# Patient Record
Sex: MALE | Race: OTHER | HISPANIC OR LATINO
[De-identification: names, ages, dates, MRNs, and addresses within clinical notes are randomized per-mention and may not be internally consistent; named-entity substitution may affect disease eponyms.]

---

## 2021-06-28 ENCOUNTER — TRANSCRIPTION ENCOUNTER (OUTPATIENT)
Age: 66
End: 2021-06-28

## 2021-06-28 ENCOUNTER — INPATIENT (INPATIENT)
Facility: HOSPITAL | Age: 66
LOS: 5 days | Discharge: HOME | DRG: 20 | End: 2021-07-04
Attending: NEUROLOGICAL SURGERY | Admitting: NEUROLOGICAL SURGERY
Payer: COMMERCIAL

## 2021-06-28 VITALS
HEART RATE: 61 BPM | SYSTOLIC BLOOD PRESSURE: 159 MMHG | DIASTOLIC BLOOD PRESSURE: 94 MMHG | HEIGHT: 70 IN | OXYGEN SATURATION: 98 % | WEIGHT: 199.96 LBS | RESPIRATION RATE: 18 BRPM | TEMPERATURE: 98 F

## 2021-06-28 DIAGNOSIS — S06.5X9A TRAUMATIC SUBDURAL HEMORRHAGE WITH LOSS OF CONSCIOUSNESS OF UNSPECIFIED DURATION, INITIAL ENCOUNTER: ICD-10-CM

## 2021-06-28 LAB
ALBUMIN SERPL ELPH-MCNC: 4.3 G/DL — SIGNIFICANT CHANGE UP (ref 3.3–5)
ALP SERPL-CCNC: 78 U/L — SIGNIFICANT CHANGE UP (ref 40–120)
ALT FLD-CCNC: 16 U/L — SIGNIFICANT CHANGE UP (ref 10–45)
ANION GAP SERPL CALC-SCNC: 10 MMOL/L — SIGNIFICANT CHANGE UP (ref 5–17)
ANION GAP SERPL CALC-SCNC: 15 MMOL/L — SIGNIFICANT CHANGE UP (ref 5–17)
APTT BLD: 23.9 SEC — LOW (ref 27.5–35.5)
AST SERPL-CCNC: 13 U/L — SIGNIFICANT CHANGE UP (ref 10–40)
BASOPHILS # BLD AUTO: 0.05 K/UL — SIGNIFICANT CHANGE UP (ref 0–0.2)
BASOPHILS NFR BLD AUTO: 0.4 % — SIGNIFICANT CHANGE UP (ref 0–2)
BILIRUB SERPL-MCNC: 0.3 MG/DL — SIGNIFICANT CHANGE UP (ref 0.2–1.2)
BLD GP AB SCN SERPL QL: NEGATIVE — SIGNIFICANT CHANGE UP
BUN SERPL-MCNC: 13 MG/DL — SIGNIFICANT CHANGE UP (ref 7–23)
BUN SERPL-MCNC: 18 MG/DL — SIGNIFICANT CHANGE UP (ref 7–23)
CALCIUM SERPL-MCNC: 8.5 MG/DL — SIGNIFICANT CHANGE UP (ref 8.4–10.5)
CALCIUM SERPL-MCNC: 9.3 MG/DL — SIGNIFICANT CHANGE UP (ref 8.4–10.5)
CHLORIDE SERPL-SCNC: 101 MMOL/L — SIGNIFICANT CHANGE UP (ref 96–108)
CHLORIDE SERPL-SCNC: 101 MMOL/L — SIGNIFICANT CHANGE UP (ref 96–108)
CO2 SERPL-SCNC: 22 MMOL/L — SIGNIFICANT CHANGE UP (ref 22–31)
CO2 SERPL-SCNC: 23 MMOL/L — SIGNIFICANT CHANGE UP (ref 22–31)
CREAT SERPL-MCNC: 0.59 MG/DL — SIGNIFICANT CHANGE UP (ref 0.5–1.3)
CREAT SERPL-MCNC: 0.81 MG/DL — SIGNIFICANT CHANGE UP (ref 0.5–1.3)
EOSINOPHIL # BLD AUTO: 0.25 K/UL — SIGNIFICANT CHANGE UP (ref 0–0.5)
EOSINOPHIL NFR BLD AUTO: 2 % — SIGNIFICANT CHANGE UP (ref 0–6)
GLUCOSE SERPL-MCNC: 134 MG/DL — HIGH (ref 70–99)
GLUCOSE SERPL-MCNC: 146 MG/DL — HIGH (ref 70–99)
HCT VFR BLD CALC: 38.2 % — LOW (ref 39–50)
HCT VFR BLD CALC: 44.7 % — SIGNIFICANT CHANGE UP (ref 39–50)
HGB BLD-MCNC: 13.1 G/DL — SIGNIFICANT CHANGE UP (ref 13–17)
HGB BLD-MCNC: 14.7 G/DL — SIGNIFICANT CHANGE UP (ref 13–17)
IMM GRANULOCYTES NFR BLD AUTO: 0.3 % — SIGNIFICANT CHANGE UP (ref 0–1.5)
INR BLD: 1.04 RATIO — SIGNIFICANT CHANGE UP (ref 0.88–1.16)
LYMPHOCYTES # BLD AUTO: 28.9 % — SIGNIFICANT CHANGE UP (ref 13–44)
LYMPHOCYTES # BLD AUTO: 3.59 K/UL — HIGH (ref 1–3.3)
MAGNESIUM SERPL-MCNC: 2 MG/DL — SIGNIFICANT CHANGE UP (ref 1.6–2.6)
MCHC RBC-ENTMCNC: 30.1 PG — SIGNIFICANT CHANGE UP (ref 27–34)
MCHC RBC-ENTMCNC: 30.8 PG — SIGNIFICANT CHANGE UP (ref 27–34)
MCHC RBC-ENTMCNC: 32.9 GM/DL — SIGNIFICANT CHANGE UP (ref 32–36)
MCHC RBC-ENTMCNC: 34.3 GM/DL — SIGNIFICANT CHANGE UP (ref 32–36)
MCV RBC AUTO: 89.9 FL — SIGNIFICANT CHANGE UP (ref 80–100)
MCV RBC AUTO: 91.4 FL — SIGNIFICANT CHANGE UP (ref 80–100)
MONOCYTES # BLD AUTO: 0.7 K/UL — SIGNIFICANT CHANGE UP (ref 0–0.9)
MONOCYTES NFR BLD AUTO: 5.6 % — SIGNIFICANT CHANGE UP (ref 2–14)
NEUTROPHILS # BLD AUTO: 7.8 K/UL — HIGH (ref 1.8–7.4)
NEUTROPHILS NFR BLD AUTO: 62.8 % — SIGNIFICANT CHANGE UP (ref 43–77)
NRBC # BLD: 0 /100 WBCS — SIGNIFICANT CHANGE UP (ref 0–0)
NRBC # BLD: 0 /100 WBCS — SIGNIFICANT CHANGE UP (ref 0–0)
PHOSPHATE SERPL-MCNC: 3 MG/DL — SIGNIFICANT CHANGE UP (ref 2.5–4.5)
PLATELET # BLD AUTO: 199 K/UL — SIGNIFICANT CHANGE UP (ref 150–400)
PLATELET # BLD AUTO: 251 K/UL — SIGNIFICANT CHANGE UP (ref 150–400)
POTASSIUM SERPL-MCNC: 3.8 MMOL/L — SIGNIFICANT CHANGE UP (ref 3.5–5.3)
POTASSIUM SERPL-MCNC: 3.8 MMOL/L — SIGNIFICANT CHANGE UP (ref 3.5–5.3)
POTASSIUM SERPL-SCNC: 3.8 MMOL/L — SIGNIFICANT CHANGE UP (ref 3.5–5.3)
POTASSIUM SERPL-SCNC: 3.8 MMOL/L — SIGNIFICANT CHANGE UP (ref 3.5–5.3)
PROT SERPL-MCNC: 7.5 G/DL — SIGNIFICANT CHANGE UP (ref 6–8.3)
PROTHROM AB SERPL-ACNC: 12.5 SEC — SIGNIFICANT CHANGE UP (ref 10.6–13.6)
RAPID RVP RESULT: SIGNIFICANT CHANGE UP
RBC # BLD: 4.25 M/UL — SIGNIFICANT CHANGE UP (ref 4.2–5.8)
RBC # BLD: 4.89 M/UL — SIGNIFICANT CHANGE UP (ref 4.2–5.8)
RBC # FLD: 12.6 % — SIGNIFICANT CHANGE UP (ref 10.3–14.5)
RBC # FLD: 12.6 % — SIGNIFICANT CHANGE UP (ref 10.3–14.5)
RH IG SCN BLD-IMP: POSITIVE — SIGNIFICANT CHANGE UP
SARS-COV-2 RNA SPEC QL NAA+PROBE: SIGNIFICANT CHANGE UP
SODIUM SERPL-SCNC: 134 MMOL/L — LOW (ref 135–145)
SODIUM SERPL-SCNC: 138 MMOL/L — SIGNIFICANT CHANGE UP (ref 135–145)
WBC # BLD: 11.07 K/UL — HIGH (ref 3.8–10.5)
WBC # BLD: 12.43 K/UL — HIGH (ref 3.8–10.5)
WBC # FLD AUTO: 11.07 K/UL — HIGH (ref 3.8–10.5)
WBC # FLD AUTO: 12.43 K/UL — HIGH (ref 3.8–10.5)

## 2021-06-28 PROCEDURE — 70486 CT MAXILLOFACIAL W/O DYE: CPT | Mod: 26

## 2021-06-28 PROCEDURE — 93010 ELECTROCARDIOGRAM REPORT: CPT

## 2021-06-28 PROCEDURE — 99291 CRITICAL CARE FIRST HOUR: CPT | Mod: 25

## 2021-06-28 PROCEDURE — G1004: CPT

## 2021-06-28 PROCEDURE — 70496 CT ANGIOGRAPHY HEAD: CPT | Mod: 26,MG

## 2021-06-28 PROCEDURE — 76377 3D RENDER W/INTRP POSTPROCES: CPT | Mod: 26

## 2021-06-28 PROCEDURE — 70498 CT ANGIOGRAPHY NECK: CPT | Mod: 26,MG

## 2021-06-28 PROCEDURE — 71045 X-RAY EXAM CHEST 1 VIEW: CPT | Mod: 26

## 2021-06-28 PROCEDURE — 76376 3D RENDER W/INTRP POSTPROCES: CPT | Mod: 26

## 2021-06-28 PROCEDURE — 70450 CT HEAD/BRAIN W/O DYE: CPT | Mod: 26,76,77

## 2021-06-28 PROCEDURE — 99291 CRITICAL CARE FIRST HOUR: CPT

## 2021-06-28 PROCEDURE — 36620 INSERTION CATHETER ARTERY: CPT

## 2021-06-28 RX ORDER — HYDROMORPHONE HYDROCHLORIDE 2 MG/ML
0.25 INJECTION INTRAMUSCULAR; INTRAVENOUS; SUBCUTANEOUS ONCE
Refills: 0 | Status: DISCONTINUED | OUTPATIENT
Start: 2021-06-28 | End: 2021-06-28

## 2021-06-28 RX ORDER — ONDANSETRON 8 MG/1
4 TABLET, FILM COATED ORAL ONCE
Refills: 0 | Status: COMPLETED | OUTPATIENT
Start: 2021-06-28 | End: 2021-06-28

## 2021-06-28 RX ORDER — ACETAMINOPHEN 500 MG
1000 TABLET ORAL ONCE
Refills: 0 | Status: COMPLETED | OUTPATIENT
Start: 2021-06-28 | End: 2021-06-28

## 2021-06-28 RX ORDER — SODIUM CHLORIDE 9 MG/ML
1000 INJECTION INTRAMUSCULAR; INTRAVENOUS; SUBCUTANEOUS ONCE
Refills: 0 | Status: COMPLETED | OUTPATIENT
Start: 2021-06-28 | End: 2021-06-28

## 2021-06-28 RX ORDER — ACETAMINOPHEN 500 MG
650 TABLET ORAL EVERY 6 HOURS
Refills: 0 | Status: DISCONTINUED | OUTPATIENT
Start: 2021-06-28 | End: 2021-07-04

## 2021-06-28 RX ORDER — METOCLOPRAMIDE HCL 10 MG
10 TABLET ORAL ONCE
Refills: 0 | Status: COMPLETED | OUTPATIENT
Start: 2021-06-28 | End: 2021-06-28

## 2021-06-28 RX ORDER — PROCHLORPERAZINE MALEATE 5 MG
10 TABLET ORAL ONCE
Refills: 0 | Status: COMPLETED | OUTPATIENT
Start: 2021-06-28 | End: 2021-06-28

## 2021-06-28 RX ORDER — LEVETIRACETAM 250 MG/1
500 TABLET, FILM COATED ORAL EVERY 12 HOURS
Refills: 0 | Status: DISCONTINUED | OUTPATIENT
Start: 2021-06-28 | End: 2021-06-29

## 2021-06-28 RX ORDER — TRAMADOL HYDROCHLORIDE 50 MG/1
50 TABLET ORAL EVERY 6 HOURS
Refills: 0 | Status: DISCONTINUED | OUTPATIENT
Start: 2021-06-28 | End: 2021-06-29

## 2021-06-28 RX ORDER — TRAMADOL HYDROCHLORIDE 50 MG/1
25 TABLET ORAL EVERY 6 HOURS
Refills: 0 | Status: DISCONTINUED | OUTPATIENT
Start: 2021-06-28 | End: 2021-07-04

## 2021-06-28 RX ORDER — POTASSIUM CHLORIDE 20 MEQ
10 PACKET (EA) ORAL
Refills: 0 | Status: COMPLETED | OUTPATIENT
Start: 2021-06-28 | End: 2021-06-28

## 2021-06-28 RX ORDER — ACETAMINOPHEN 500 MG
975 TABLET ORAL ONCE
Refills: 0 | Status: COMPLETED | OUTPATIENT
Start: 2021-06-28 | End: 2021-06-28

## 2021-06-28 RX ORDER — NIMODIPINE 60 MG/10ML
60 SOLUTION ORAL EVERY 4 HOURS
Refills: 0 | Status: DISCONTINUED | OUTPATIENT
Start: 2021-06-28 | End: 2021-06-29

## 2021-06-28 RX ORDER — SODIUM CHLORIDE 9 MG/ML
1000 INJECTION INTRAMUSCULAR; INTRAVENOUS; SUBCUTANEOUS
Refills: 0 | Status: DISCONTINUED | OUTPATIENT
Start: 2021-06-28 | End: 2021-07-01

## 2021-06-28 RX ORDER — MAGNESIUM SULFATE 500 MG/ML
1 VIAL (ML) INJECTION ONCE
Refills: 0 | Status: COMPLETED | OUTPATIENT
Start: 2021-06-28 | End: 2021-06-28

## 2021-06-28 RX ORDER — DEXAMETHASONE 0.5 MG/5ML
4 ELIXIR ORAL ONCE
Refills: 0 | Status: COMPLETED | OUTPATIENT
Start: 2021-06-28 | End: 2021-06-28

## 2021-06-28 RX ORDER — POLYETHYLENE GLYCOL 3350 17 G/17G
17 POWDER, FOR SOLUTION ORAL
Refills: 0 | Status: DISCONTINUED | OUTPATIENT
Start: 2021-06-28 | End: 2021-07-04

## 2021-06-28 RX ORDER — LEVETIRACETAM 250 MG/1
500 TABLET, FILM COATED ORAL EVERY 12 HOURS
Refills: 0 | Status: DISCONTINUED | OUTPATIENT
Start: 2021-06-28 | End: 2021-06-28

## 2021-06-28 RX ORDER — NICARDIPINE HYDROCHLORIDE 30 MG/1
5 CAPSULE, EXTENDED RELEASE ORAL
Qty: 40 | Refills: 0 | Status: DISCONTINUED | OUTPATIENT
Start: 2021-06-28 | End: 2021-06-29

## 2021-06-28 RX ORDER — SENNA PLUS 8.6 MG/1
2 TABLET ORAL AT BEDTIME
Refills: 0 | Status: DISCONTINUED | OUTPATIENT
Start: 2021-06-28 | End: 2021-07-04

## 2021-06-28 RX ORDER — METOCLOPRAMIDE HCL 10 MG
10 TABLET ORAL ONCE
Refills: 0 | Status: DISCONTINUED | OUTPATIENT
Start: 2021-06-28 | End: 2021-06-28

## 2021-06-28 RX ORDER — MORPHINE SULFATE 50 MG/1
4 CAPSULE, EXTENDED RELEASE ORAL ONCE
Refills: 0 | Status: DISCONTINUED | OUTPATIENT
Start: 2021-06-28 | End: 2021-06-28

## 2021-06-28 RX ORDER — TRAMADOL HYDROCHLORIDE 50 MG/1
50 TABLET ORAL ONCE
Refills: 0 | Status: DISCONTINUED | OUTPATIENT
Start: 2021-06-28 | End: 2021-06-28

## 2021-06-28 RX ORDER — CHLORHEXIDINE GLUCONATE 213 G/1000ML
1 SOLUTION TOPICAL
Refills: 0 | Status: DISCONTINUED | OUTPATIENT
Start: 2021-06-28 | End: 2021-07-04

## 2021-06-28 RX ORDER — HYDROMORPHONE HYDROCHLORIDE 2 MG/ML
0.5 INJECTION INTRAMUSCULAR; INTRAVENOUS; SUBCUTANEOUS ONCE
Refills: 0 | Status: DISCONTINUED | OUTPATIENT
Start: 2021-06-28 | End: 2021-06-28

## 2021-06-28 RX ADMIN — SODIUM CHLORIDE 1000 MILLILITER(S): 9 INJECTION INTRAMUSCULAR; INTRAVENOUS; SUBCUTANEOUS at 10:40

## 2021-06-28 RX ADMIN — MORPHINE SULFATE 4 MILLIGRAM(S): 50 CAPSULE, EXTENDED RELEASE ORAL at 10:40

## 2021-06-28 RX ADMIN — Medication 4 MILLIGRAM(S): at 23:14

## 2021-06-28 RX ADMIN — Medication 400 MILLIGRAM(S): at 20:20

## 2021-06-28 RX ADMIN — HYDROMORPHONE HYDROCHLORIDE 0.5 MILLIGRAM(S): 2 INJECTION INTRAMUSCULAR; INTRAVENOUS; SUBCUTANEOUS at 23:30

## 2021-06-28 RX ADMIN — NICARDIPINE HYDROCHLORIDE 25 MG/HR: 30 CAPSULE, EXTENDED RELEASE ORAL at 14:07

## 2021-06-28 RX ADMIN — Medication 10 MILLIGRAM(S): at 07:15

## 2021-06-28 RX ADMIN — Medication 100 MILLIEQUIVALENT(S): at 23:15

## 2021-06-28 RX ADMIN — HYDROMORPHONE HYDROCHLORIDE 0.25 MILLIGRAM(S): 2 INJECTION INTRAMUSCULAR; INTRAVENOUS; SUBCUTANEOUS at 22:05

## 2021-06-28 RX ADMIN — SODIUM CHLORIDE 1000 MILLILITER(S): 9 INJECTION INTRAMUSCULAR; INTRAVENOUS; SUBCUTANEOUS at 08:41

## 2021-06-28 RX ADMIN — Medication 975 MILLIGRAM(S): at 07:15

## 2021-06-28 RX ADMIN — Medication 400 MILLIGRAM(S): at 13:54

## 2021-06-28 RX ADMIN — ONDANSETRON 4 MILLIGRAM(S): 8 TABLET, FILM COATED ORAL at 06:45

## 2021-06-28 RX ADMIN — Medication 1 GRAM(S): at 10:40

## 2021-06-28 RX ADMIN — TRAMADOL HYDROCHLORIDE 50 MILLIGRAM(S): 50 TABLET ORAL at 19:30

## 2021-06-28 RX ADMIN — NIMODIPINE 60 MILLIGRAM(S): 60 SOLUTION ORAL at 14:10

## 2021-06-28 RX ADMIN — SODIUM CHLORIDE 75 MILLILITER(S): 9 INJECTION INTRAMUSCULAR; INTRAVENOUS; SUBCUTANEOUS at 16:50

## 2021-06-28 RX ADMIN — SODIUM CHLORIDE 1000 MILLILITER(S): 9 INJECTION INTRAMUSCULAR; INTRAVENOUS; SUBCUTANEOUS at 06:45

## 2021-06-28 RX ADMIN — HYDROMORPHONE HYDROCHLORIDE 0.5 MILLIGRAM(S): 2 INJECTION INTRAMUSCULAR; INTRAVENOUS; SUBCUTANEOUS at 23:15

## 2021-06-28 RX ADMIN — HYDROMORPHONE HYDROCHLORIDE 0.25 MILLIGRAM(S): 2 INJECTION INTRAMUSCULAR; INTRAVENOUS; SUBCUTANEOUS at 21:50

## 2021-06-28 RX ADMIN — Medication 100 GRAM(S): at 09:49

## 2021-06-28 RX ADMIN — Medication 975 MILLIGRAM(S): at 06:56

## 2021-06-28 RX ADMIN — Medication 100 MILLIEQUIVALENT(S): at 22:13

## 2021-06-28 RX ADMIN — Medication 1000 MILLIGRAM(S): at 20:35

## 2021-06-28 RX ADMIN — LEVETIRACETAM 400 MILLIGRAM(S): 250 TABLET, FILM COATED ORAL at 17:04

## 2021-06-28 RX ADMIN — Medication 10 MILLIGRAM(S): at 23:00

## 2021-06-28 RX ADMIN — MORPHINE SULFATE 4 MILLIGRAM(S): 50 CAPSULE, EXTENDED RELEASE ORAL at 08:40

## 2021-06-28 RX ADMIN — ONDANSETRON 4 MILLIGRAM(S): 8 TABLET, FILM COATED ORAL at 20:40

## 2021-06-28 RX ADMIN — Medication 10 MILLIGRAM(S): at 23:15

## 2021-06-28 RX ADMIN — Medication 1000 MILLIGRAM(S): at 14:09

## 2021-06-28 RX ADMIN — TRAMADOL HYDROCHLORIDE 50 MILLIGRAM(S): 50 TABLET ORAL at 20:00

## 2021-06-28 RX ADMIN — CHLORHEXIDINE GLUCONATE 1 APPLICATION(S): 213 SOLUTION TOPICAL at 22:12

## 2021-06-28 RX ADMIN — SODIUM CHLORIDE 1000 MILLILITER(S): 9 INJECTION INTRAMUSCULAR; INTRAVENOUS; SUBCUTANEOUS at 07:15

## 2021-06-28 NOTE — ED PROVIDER NOTE - NS ED ROS FT
Constitutional: no fevers; no chills  HEENT: no visual changes, no sore throat, no rhinorrhea  CV: no cp; no palpitations  Resp: no sob; no cough  GI: no abd pain, nausea, vomiting, no diarrhea, no constipation  : no dysuria, no hematuria  MSK: no myalgais; no arthralgias  skin: no rashes  neuro: HA, no numbness; no weakness, no tingling; dizzy;   ROS statement: all other ROS negative except as per HPI

## 2021-06-28 NOTE — ED PROVIDER NOTE - PROGRESS NOTE DETAILS
Skinny FALCON: Received sign out from my colleague Dr. Grande pt presents w headache in setting of recent possible OE/OM?, HA worsening over last week and acutely so over last few days a/w n/v, at time of sign out pending labs, ct, reassessment, possible LP pending findings. Li: radiology called with significant finding, bilateral subdural hematoma without a clear course. no hx of trauma. neurosurg consult and to see patient. preop labs obtained. pending 4 hr CT at 1pm Li: DCU PA states patient is too complex for CDU. will likely admit patient to either neurosrg vs medicine based on 4hr CT Skinny FALCON: see PNs as above, pt w c/f b/l SDH seen and eval'd by neuro, consider for possible csf leak at this time however unclear etiology as to location, pt w/o fever in ED, reports while away did [place] line into [place] therefore possible traumatic mechanism? neurosurg recommending interval cth and reassessment will re-discuss with nsurg thereafter and dispo accordingly.

## 2021-06-28 NOTE — ED ADULT NURSE NOTE - OBJECTIVE STATEMENT
65y M, A&Ox4, ambulatory, presents to the ED with wife at bedside from home c/o . Gross neuro intact, lungs cta bilaterally, no difficulty speaking in complete sentences, s1s2 heart sounds heard, pulses x 4, moving all extremities, abdomen soft nontender nondistended, skin intact. Pt denies fevers/chills, numbness/tingling, weakness, dizziness, vision changes, cp, sob, abd pain, diarrhea, dysuria, hematuria, bloody stools, back pain. 65y M, A&Ox4, ambulatory, presents to the ED with wife at bedside from home c/o headache. [place], 1 week ago dx with ear infection- did not complete course of antibiotics. Now having throbbing headaches, worse since saturday a/w neck pain and vomiting. Gross neuro intact, lungs cta bilaterally, no difficulty speaking in complete sentences, s1s2 heart sounds heard, pulses x 4, moving all extremities, abdomen soft nontender nondistended, skin intact. Pt denies fevers/chills, numbness/tingling, weakness, dizziness, vision changes, cp, sob, abd pain, diarrhea, dysuria, hematuria, bloody stools, back pain.

## 2021-06-28 NOTE — ED PROVIDER NOTE - OBJECTIVE STATEMENT
66 yo M with no known PMHx, presents to the ED c/o worsening b/l temporal and occipital HA over the past 3 days. Pt returned from the [place] 2 weeks ago. 1 week ago, noted "whooshing" sensation to his R ear. Saw an ENT, was given oral abx and otic abx drops but only took about 2 doses. He had this headache and it was mild however, 3 days ago, it has worsened. He then felt nausea and vomited a few times. Pt also noting dizziness, described as unsteadiness. The wife, who is at bedside, endorsed that pt appeared unsteady when ambulating. He is also endorsing neck pain/stiffness. He denies fevers, chills, diarrhea, cp, sob, numbness, weakness, visual changes (diplopia, blurred vision, loss of vision), abd pain.

## 2021-06-28 NOTE — ED ADULT NURSE REASSESSMENT NOTE - NS ED NURSE REASSESS COMMENT FT1
Report received from ERNIE Benoit. Pt is breathing unlabored on RA. VSS. Pt endorses headache at this time, denies blurry vision, N+V. PERRL. Speech clear. Pt medicated as per MD order. Educated pt on plan of care. Safety and comfort maintained. Awaiting CT. Call bell within reach. Pt wife at bedside.

## 2021-06-28 NOTE — H&P ADULT - HISTORY OF PRESENT ILLNESS
66 yo M with no known PMHx, presents to the ED c/o worsening b/l temporal and occipital HA over the past 3 days. Pt returned from the [place] 2 weeks ago. 1 week ago, noted "whooshing" sensation to his R ear. Saw an ENT, was given oral abx and otic abx drops but only took about 2 doses. He had this headache and it was mild however, 3 days ago, it has worsened. He then felt nausea and vomited a few times. Pt also noting dizziness, described as unsteadiness. The wife, who is at bedside, endorsed that pt appeared unsteady when ambulating. He is also endorsing neck pain/stiffness. He denies fevers, chills, diarrhea, cp, sob, numbness, weakness, visual changes (diplopia, blurred vision, loss of vision), abd pain

## 2021-06-28 NOTE — DISCHARGE NOTE NURSING/CASE MANAGEMENT/SOCIAL WORK - PATIENT PORTAL LINK FT
You can access the FollowMyHealth Patient Portal offered by [facility] by registering at the following website: [email or web address]. By joining [employer]’s FollowMyHealth portal, you will also be able to view your health information using other applications (apps) compatible with our system.

## 2021-06-28 NOTE — PROVIDER CONTACT NOTE (MEDICATION) - ACTION/TREATMENT ORDERED:
2200 Nimodipine held as per NO Roberts, will continue to monitor 2200 and 0200 Nimodipine held as per NO Roberts, will continue to monitor 2200, 0200 and 0600n Nimodipine held as per NO Roberts, will continue to monitor

## 2021-06-28 NOTE — CHART NOTE - NSCHARTNOTEFT_GEN_A_CORE
Was notified by patient's RN Brittany that 6pm dose of nimodipine was held due to bradycardia (HR 55). Agreed with above plan.

## 2021-06-28 NOTE — ED PROVIDER NOTE - ATTENDING CONTRIBUTION TO CARE
Pt presents with HA worsening over 3 days, not thunderclap, along with unsteady gait. pt also recently tx for ear infection. on exam, pt is neuro intact. however, symptoms are concerning for intracranial pathology. pt will get Ct/CTA to r/o bleed. if neg, may require LP to r/o bleed vs infection. At the end of my shift, I signed off the care of the patient to oncoming physician. Plan is for await imaging, reassess.

## 2021-06-28 NOTE — ED ADULT NURSE REASSESSMENT NOTE - NS ED NURSE REASSESS COMMENT FT1
Pt is breathing unlabored on RA. VSS. See neuro paper flowsheet. Educated pt on plan of care. Safety and comfort maintained. Pt provided urinal. Awaiting neurosurgery. Call bell within reach.

## 2021-06-28 NOTE — ED PROVIDER NOTE - CLINICAL SUMMARY MEDICAL DECISION MAKING FREE TEXT BOX
Kenn Pritchard MD. pt with no significant pmhx presents w/ HA x1 week in the setting of being tx'd for ?otitis externa with oral and otic gtt antibitoics. pt did not compelte course of abx. 3 days ago, headache became worse. he kiran a thunderclap like sensation but pt has had vomiting and dizziness. the wife describes an unsteady gait when ambulating. pt actively vomiting upon my history and physical. pt also endorsing neck stiffness. pt neurologically intact. walks w/ steady gait. no cerebellar signs. pt is afebrile but reports subjetive neck stiffness although there are no objective neck stiffness. although pt appears well, he does not have known medical hx and there is a concern for possible meningitis given dx of otitis externa and incomplete abx use vs ?sah hemorrhage given worsening of sx with vomiting. will plan for labs, ivf, zofran for the nausea, ct head. if ct head negative and patient's pain not improved, low threshold to LP to eval for meningitis. will need reassessment Kenn Pritchard MD. pt with no significant pmhx presents w/ HA x1 week in the setting of being tx'd for ?otitis externa with oral and otic gtt antibiotics. pt did not complete course of abx. 3 days ago, headache became worse. he denies a thunderclap like sensation but pt has had vomiting and dizziness. the wife describes an unsteady gait when ambulating. pt actively vomiting upon my history and physical. pt also endorsing neck stiffness and has a whooshing right ear sensation. pt neurologically intact. walks w/ steady gait. no cerebellar signs. pt is afebrile but reports subjective neck stiffness although there are no objective neck stiffness. although pt appears well, he does not have known medical hx and there is a concern for possible meningitis given dx of otitis externa and incomplete abx use vs ?sah hemorrhage given worsening of sx with vomiting. will plan for labs, ivf, zofran for the nausea, ct head, cta for possible vert dissection given whooshing sound.. if ct head negative and patient's pain not improved, low threshold to LP to eval for meningitis. pt would be agreeable to an LP.

## 2021-06-28 NOTE — ED PROVIDER NOTE - PHYSICAL EXAMINATION
PHYSICAL EXAM:  GENERAL: non-toxic appearing; in no respiratory distress  HEAD Atraumatic, Normocephalic  ENT: RIGHT EAR: erythema of the ear canal; tm is gray, non bulging;   NECK: No JVD; FROM  EYES: PERRL, EOMs intact b/l w/out deficits; normal conjunctiva  CHEST/LUNG: CTAB no wheezes/rhonchi/rales  HEART: RRR no murmur/gallops/rubs  ABDOMEN: +BS, soft, NT, ND  EXTREMITIES: No LE edema, +2 radial pulses b/l, +2 DP/PT pulses b/l  MUSCULOSKELETAL: FROM of all 4 extremities  NERVOUS SYSTEM:  A&Ox3, No motor deficits or sensory deficits; CNII-XII intact; no focal neurologic deficits; no dysemtria; no dysdiadochokinesia; ambulate w/ steady gait; negative kernig and brudzinski signs;   SKIN:  No new rashes

## 2021-06-28 NOTE — PROGRESS NOTE ADULT - SUBJECTIVE AND OBJECTIVE BOX
HPI:  66 yo M with no known PMHx, presents to the ED c/o worsening b/l temporal and occipital HA over the past 3 days. Pt returned from the [place] 2 weeks ago. 1 week ago, noted "whooshing" sensation to his R ear. Saw an ENT, was given oral abx and otic abx drops but only took about 2 doses. He had this headache and it was mild however, 3 days ago, it has worsened. He then felt nausea and vomited a few times. Pt also noting dizziness, described as unsteadiness. The wife, who is at bedside, endorsed that pt appeared unsteady when ambulating. He is also endorsing neck pain/stiffness. He denies fevers, chills, diarrhea, cp, sob, numbness, weakness, visual changes (diplopia, blurred vision, loss of vision), abd pain (28 Jun 2021 11:31)      EVENTS:   Admitted to the NSCU for bilateral subdural hematoma      ICU Vital Signs Last 24 Hrs  T(C): 36.7 (28 Jun 2021 13:19), Max: 36.9 (28 Jun 2021 07:15)  T(F): 98.1 (28 Jun 2021 13:19), Max: 98.5 (28 Jun 2021 09:30)  HR: 57 (28 Jun 2021 13:19) (54 - 70)  BP: 153/78 (28 Jun 2021 13:19) (123/67 - 159/94)  BP(mean): 99 (28 Jun 2021 13:19) (99 - 99)  ABP: --  ABP(mean): --  RR: 20 (28 Jun 2021 13:19) (15 - 20)  SpO2: 97% (28 Jun 2021 13:19) (95% - 100%)     06-28 @ 07:01  -  06-28 @ 13:48  --------------------------------------------------------  IN: 75 mL / OUT: 0 mL / NET: 75 mL                             14.7   12.43 )-----------( 251      ( 28 Jun 2021 07:07 )             44.7    06-28    138  |  101  |  18  ----------------------------<  146<H>  3.8   |  22  |  0.81    Ca    9.3      28 Jun 2021 07:07    TPro  7.5  /  Alb  4.3  /  TBili  0.3  /  DBili  x   /  AST  13  /  ALT  16  /  AlkPhos  78  06-28            PHYSICAL EXAM:    General: No Acute Distress     Neurological: Awake, alert oriented to person, place and time, Following Commands, PERRL, EOMI, Face Symmetrical, Speech Fluent, Moving all extremities, Muscle Strength normal in all four extremities, No Drift, Sensation to Light Touch Intact    Pulmonary: Clear to Auscultation, No Rales, No Rhonchi, No Wheezes     Cardiovascular: S1, S2, Regular Rate and Rhythm     Gastrointestinal: Soft, Nontender, Nondistended     Extremities: No calf tenderness     Incision:       MEDICATIONS:  Antibiotics:      Neurological:   acetaminophen   Tablet .. 650 milliGRAM(s) Oral every 6 hours PRN  acetaminophen  IVPB .. 1000 milliGRAM(s) IV Intermittent once  levETIRAcetam 500 milliGRAM(s) Oral every 12 hours  ondansetron Injectable 4 milliGRAM(s) IV Push once    Cardiac:   niCARdipine Infusion 5 mG/Hr IV Continuous <Continuous>  niMODipine 60 milliGRAM(s) Oral every 4 hours    Pulm:    Heme:     Other:        DEVICES: [] Restraints [] MAEGAN/HMV []LD [] ET tube [] Trach [] Chest Tube [] A-line [] Puente [] NGT [] Rectal Tube       Brain CT:    A repeat demonstrated is a left holohemispheric mixed density subdural hematoma measuring roughly 7 mm in greatest depth and not severely change in appearance compared with the prior study. An anterior interhemispheric subdural hematoma is unchanged in appearance.    A predominantly low density right frontal convexity subdural collection measures approximately 6 mm in depth and is unchanged in appearance. There is approximately 4 mm rightward shift, unchanged compared with the prior study.    No hydrocephalus is present.    No new areas of hemorrhage are identified.    The orbits are not remarkable in appearance.    Maxillofacial bone CT:    No acute fracture is identified. The orbital rims are intact. No intraorbital hematoma is identified. There is a mild bilateral frontal and maxillary sinus mucosal thickening. A left maxillary sinus polyp versus retention cyst is present. The remaining paranasal sinuses are well aerated. The tympanomastoid cavities are free of acute disease.    IMPRESSION:    Brain CT: Unchanged right frontal convexity, left holohemispheric and anterior interhemispheric subdural hematomas. Unchanged 4 mm rightward midline shift.    Maxillofacial bone CT: No acute fracture.    A/P:  66 yo M  2 weeks history of headache with worsening headach today.  CT head     Neuro:   Respiratory:   CV:  Endocrine:   Heme/Onc:             DVT ppx:   Renal:   ID:   GI:   Social/Family:   Discharge planning:     Code Status: [] Full Code [] DNR [] DNI [] Goals of Care:   Disposition: [] ICU [] Stroke Unit [] RCU []PCU []Floor [] Discharge Home     Patient at high risk for neurologic deterioration, critical care time, excluding procedures: 45 minutes                    HPI:  64 yo M with no known PMHx, presents to the ED c/o worsening b/l temporal and occipital HA over the past 3 days. Pt returned from the [place] 2 weeks ago. 1 week ago, noted "whooshing" sensation to his R ear. Saw an ENT, was given oral abx and otic abx drops but only took about 2 doses. He had this headache and it was mild however, 3 days ago, it has worsened. He then felt nausea and vomited a few times. Pt also noting dizziness, described as unsteadiness. The wife, who is at bedside, endorsed that pt appeared unsteady when ambulating. He is also endorsing neck pain/stiffness. He denies fevers, chills, diarrhea, cp, sob, numbness, weakness, visual changes (diplopia, blurred vision, loss of vision), abd pain (28 Jun 2021 11:31)      EVENTS:   Admitted to the NSCU for bilateral subdural hematoma      ICU Vital Signs Last 24 Hrs  T(C): 36.7 (28 Jun 2021 13:19), Max: 36.9 (28 Jun 2021 07:15)  T(F): 98.1 (28 Jun 2021 13:19), Max: 98.5 (28 Jun 2021 09:30)  HR: 57 (28 Jun 2021 13:19) (54 - 70)  BP: 153/78 (28 Jun 2021 13:19) (123/67 - 159/94)  BP(mean): 99 (28 Jun 2021 13:19) (99 - 99)  ABP: --  ABP(mean): --  RR: 20 (28 Jun 2021 13:19) (15 - 20)  SpO2: 97% (28 Jun 2021 13:19) (95% - 100%)     06-28 @ 07:01  -  06-28 @ 13:48  --------------------------------------------------------  IN: 75 mL / OUT: 0 mL / NET: 75 mL                             14.7   12.43 )-----------( 251      ( 28 Jun 2021 07:07 )             44.7    06-28    138  |  101  |  18  ----------------------------<  146<H>  3.8   |  22  |  0.81    Ca    9.3      28 Jun 2021 07:07    TPro  7.5  /  Alb  4.3  /  TBili  0.3  /  DBili  x   /  AST  13  /  ALT  16  /  AlkPhos  78  06-28            PHYSICAL EXAM:    General: No Acute Distress     Neurological: Awake, alert oriented to person, place and time, Following Commands, PERRL, EOMI, Face Symmetrical, Speech Fluent, Moving all extremities, Muscle Strength normal in all four extremities, No Drift, Sensation to Light Touch Intact    Pulmonary: Clear to Auscultation, No Rales, No Rhonchi, No Wheezes     Cardiovascular: S1, S2, Regular Rate and Rhythm     Gastrointestinal: Soft, Nontender, Nondistended     Extremities: No calf tenderness     Incision:       MEDICATIONS:  Antibiotics:      Neurological:   acetaminophen   Tablet .. 650 milliGRAM(s) Oral every 6 hours PRN  acetaminophen  IVPB .. 1000 milliGRAM(s) IV Intermittent once  levETIRAcetam 500 milliGRAM(s) Oral every 12 hours  ondansetron Injectable 4 milliGRAM(s) IV Push once    Cardiac:   niCARdipine Infusion 5 mG/Hr IV Continuous <Continuous>  niMODipine 60 milliGRAM(s) Oral every 4 hours    Pulm:    Heme:     Other:        DEVICES: [] Restraints [] MAEGAN/HMV []LD [] ET tube [] Trach [] Chest Tube [] A-line [] Puente [] NGT [] Rectal Tube       Brain CT:    A repeat demonstrated is a left holohemispheric mixed density subdural hematoma measuring roughly 7 mm in greatest depth and not severely change in appearance compared with the prior study. An anterior interhemispheric subdural hematoma is unchanged in appearance.    A predominantly low density right frontal convexity subdural collection measures approximately 6 mm in depth and is unchanged in appearance. There is approximately 4 mm rightward shift, unchanged compared with the prior study.    No hydrocephalus is present.    No new areas of hemorrhage are identified.    The orbits are not remarkable in appearance.    Maxillofacial bone CT:    No acute fracture is identified. The orbital rims are intact. No intraorbital hematoma is identified. There is a mild bilateral frontal and maxillary sinus mucosal thickening. A left maxillary sinus polyp versus retention cyst is present. The remaining paranasal sinuses are well aerated. The tympanomastoid cavities are free of acute disease.    IMPRESSION:    Brain CT: Unchanged right frontal convexity, left holohemispheric and anterior interhemispheric subdural hematomas. Unchanged 4 mm rightward midline shift.    Maxillofacial bone CT: No acute fracture.    A/P:  64 yo M  2 weeks history of headache with worsening headach today.  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 Pt returned from the [place] 2 weeks ago. 1 week ago, noted "whooshing" sensation to his R ear. Saw an ENT, was given oral abx and otic abx drops but only took about 2 doses. He had this headache and it was mild however, 3 days ago, it has worsened. He then felt nausea and vomited a few times. Pt also noting dizziness, described as unsteadiness. The wife, who is at bedside, endorsed that pt appeared unsteady when ambulating. He is also endorsing neck pain/stiffness. He denies fevers, chills, diarrhea, cp, sob, numbness, weakness, visual changes (diplopia, blurred vision, loss of vision), abd pain (28 Jun 2021 11:31)      EVENTS:   Admitted to the NSCU for bilateral subdural hematoma      ICU Vital Signs Last 24 Hrs  T(C): 36.7 (28 Jun 2021 13:19), Max: 36.9 (28 Jun 2021 07:15)  T(F): 98.1 (28 Jun 2021 13:19), Max: 98.5 (28 Jun 2021 09:30)  HR: 57 (28 Jun 2021 13:19) (54 - 70)  BP: 153/78 (28 Jun 2021 13:19) (123/67 - 159/94)  BP(mean): 99 (28 Jun 2021 13:19) (99 - 99)  ABP: --  ABP(mean): --  RR: 20 (28 Jun 2021 13:19) (15 - 20)  SpO2: 97% (28 Jun 2021 13:19) (95% - 100%)     06-28 @ 07:01  -  06-28 @ 13:48  --------------------------------------------------------  IN: 75 mL / OUT: 0 mL / NET: 75 mL                             14.7   12.43 )-----------( 251      ( 28 Jun 2021 07:07 )             44.7    06-28    138  |  101  |  18  ----------------------------<  146<H>  3.8   |  22  |  0.81    Ca    9.3      28 Jun 2021 07:07    TPro  7.5  /  Alb  4.3  /  TBili  0.3  /  DBili  x   /  AST  13  /  ALT  16  /  AlkPhos  78  06-28            PHYSICAL EXAM:    General: No Acute Distress     Neurological: Awake, alert oriented to person, place and time, Following Commands, PERRL, EOMI, Face Symmetrical, Speech Fluent, Moving all extremities, Muscle Strength normal in all four extremities, No Drift, Sensation to Light Touch Intact    Pulmonary: Clear to Auscultation, No Rales, No Rhonchi, No Wheezes     Cardiovascular: S1, S2, Regular Rate and Rhythm     Gastrointestinal: Soft, Nontender, Nondistended     Extremities: No calf tenderness     Incision:       MEDICATIONS:  Antibiotics:      Neurological:   acetaminophen   Tablet .. 650 milliGRAM(s) Oral every 6 hours PRN  acetaminophen  IVPB .. 1000 milliGRAM(s) IV Intermittent once  levETIRAcetam 500 milliGRAM(s) Oral every 12 hours  ondansetron Injectable 4 milliGRAM(s) IV Push once    Cardiac:   niCARdipine Infusion 5 mG/Hr IV Continuous <Continuous>  niMODipine 60 milliGRAM(s) Oral every 4 hours    Pulm:    Heme:     Other:        DEVICES: [] Restraints [] MAEGAN/HMV []LD [] ET tube [] Trach [] Chest Tube [] A-line [] Puente [] NGT [] Rectal Tube       Brain CT:    A repeat demonstrated is a left holohemispheric mixed density subdural hematoma measuring roughly 7 mm in greatest depth and not severely change in appearance compared with the prior study. An anterior interhemispheric subdural hematoma is unchanged in appearance.    A predominantly low density right frontal convexity subdural collection measures approximately 6 mm in depth and is unchanged in appearance. There is approximately 4 mm rightward shift, unchanged compared with the prior study.    No hydrocephalus is present.    No new areas of hemorrhage are identified.    The orbits are not remarkable in appearance.    Maxillofacial bone CT:    No acute fracture is identified. The orbital rims are intact. No intraorbital hematoma is identified. There is a mild bilateral frontal and maxillary sinus mucosal thickening. A left maxillary sinus polyp versus retention cyst is present. The remaining paranasal sinuses are well aerated. The tympanomastoid cavities are free of acute disease.    IMPRESSION:    Brain CT: Unchanged right frontal convexity, left holohemispheric and anterior interhemispheric subdural hematomas. Unchanged 4 mm rightward midline shift.    Maxillofacial bone CT: No acute fracture.    A/P:  64 yo M  2 weeks history of headache with worsening headach today.  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

## 2021-06-28 NOTE — PROGRESS NOTE ADULT - SUBJECTIVE AND OBJECTIVE BOX
HPI:  64 yo M with no known PMHx, presents to the ED c/o worsening b/l temporal and occipital HA over the past 3 days. Pt returned from the [place] 2 weeks ago. 1 week ago, noted "whooshing" sensation to his R ear. Saw an ENT, was given oral abx and otic abx drops but only took about 2 doses. He had this headache and it was mild however, 3 days ago, it has worsened. He then felt nausea and vomited a few times. Pt also noting dizziness, described as unsteadiness. The wife, who is at bedside, endorsed that pt appeared unsteady when ambulating. He is also endorsing neck pain/stiffness. He denies fevers, chills, diarrhea, cp, sob, numbness, weakness, visual changes (diplopia, blurred vision, loss of vision), abd pain (28 Jun 2021 11:31)      EVENTS:   Admitted to the NSCU for bilateral subdural hematoma      ICU Vital Signs Last 24 Hrs  T(C): 36.7 (28 Jun 2021 13:19), Max: 36.9 (28 Jun 2021 07:15)  T(F): 98.1 (28 Jun 2021 13:19), Max: 98.5 (28 Jun 2021 09:30)  HR: 57 (28 Jun 2021 13:19) (54 - 70)  BP: 153/78 (28 Jun 2021 13:19) (123/67 - 159/94)  BP(mean): 99 (28 Jun 2021 13:19) (99 - 99)  ABP: --  ABP(mean): --  RR: 20 (28 Jun 2021 13:19) (15 - 20)  SpO2: 97% (28 Jun 2021 13:19) (95% - 100%)     06-28 @ 07:01  -  06-28 @ 13:48  --------------------------------------------------------  IN: 75 mL / OUT: 0 mL / NET: 75 mL                             14.7   12.43 )-----------( 251      ( 28 Jun 2021 07:07 )             44.7    06-28    138  |  101  |  18  ----------------------------<  146<H>  3.8   |  22  |  0.81    Ca    9.3      28 Jun 2021 07:07    TPro  7.5  /  Alb  4.3  /  TBili  0.3  /  DBili  x   /  AST  13  /  ALT  16  /  AlkPhos  78  06-28            PHYSICAL EXAM:    General: No Acute Distress     Neurological: Awake, alert oriented to person, place and time, Following Commands, PERRL, EOMI, Face Symmetrical, Speech Fluent, Moving all extremities, Muscle Strength normal in all four extremities, No Drift, Sensation to Light Touch Intact    Pulmonary: Clear to Auscultation, No Rales, No Rhonchi, No Wheezes     Cardiovascular: S1, S2, Regular Rate and Rhythm     Gastrointestinal: Soft, Nontender, Nondistended     Extremities: No calf tenderness     Incision:       MEDICATIONS:  Antibiotics:      Neurological:   acetaminophen   Tablet .. 650 milliGRAM(s) Oral every 6 hours PRN  acetaminophen  IVPB .. 1000 milliGRAM(s) IV Intermittent once  levETIRAcetam 500 milliGRAM(s) Oral every 12 hours  ondansetron Injectable 4 milliGRAM(s) IV Push once    Cardiac:   niCARdipine Infusion 5 mG/Hr IV Continuous <Continuous>  niMODipine 60 milliGRAM(s) Oral every 4 hours    Pulm:    Heme:     Other:        DEVICES: [] Restraints [] MAEGAN/HMV []LD [] ET tube [] Trach [] Chest Tube [] A-line [] Puente [] NGT [] Rectal Tube       Brain CT:    A repeat demonstrated is a left holohemispheric mixed density subdural hematoma measuring roughly 7 mm in greatest depth and not severely change in appearance compared with the prior study. An anterior interhemispheric subdural hematoma is unchanged in appearance.    A predominantly low density right frontal convexity subdural collection measures approximately 6 mm in depth and is unchanged in appearance. There is approximately 4 mm rightward shift, unchanged compared with the prior study.    No hydrocephalus is present.    No new areas of hemorrhage are identified.    The orbits are not remarkable in appearance.    Maxillofacial bone CT:    No acute fracture is identified. The orbital rims are intact. No intraorbital hematoma is identified. There is a mild bilateral frontal and maxillary sinus mucosal thickening. A left maxillary sinus polyp versus retention cyst is present. The remaining paranasal sinuses are well aerated. The tympanomastoid cavities are free of acute disease.    IMPRESSION:    Brain CT: Unchanged right frontal convexity, left holohemispheric and anterior interhemispheric subdural hematomas. Unchanged 4 mm rightward midline shift.    Maxillofacial bone CT: No acute fracture.    A/P:  64 yo M  2 weeks history of headache with worsening headach today.  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 Pt returned from the [place] 2 weeks ago. 1 week ago, noted "whooshing" sensation to his R ear. Saw an ENT, was given oral abx and otic abx drops but only took about 2 doses. He had a mild headache at the time however, it worsened 3 days ago. He then felt nausea and vomited a few times. Pt also noting dizziness, described as unsteadiness. The wife, who is at bedside, endorsed that pt appeared unsteady when ambulating. He is also endorsing neck pain/stiffness. He denies fevers, chills, diarrhea, cp, sob, numbness, weakness, visual changes (diplopia, blurred vision, loss of vision), abd pain (28 Jun 2021 11:31)      EVENTS:   Admitted to the NSCU for bilateral subdural hematoma    VITALS/ LABS/ IMAGING: reviewed     MEDICATIONS  (STANDING):  chlorhexidine 4% Liquid 1 Application(s) Topical <User Schedule>  levETIRAcetam  IVPB 500 milliGRAM(s) IV Intermittent every 12 hours  niCARdipine Infusion 5 mG/Hr (25 mL/Hr) IV Continuous <Continuous>  niMODipine 60 milliGRAM(s) Oral every 4 hours  ondansetron Injectable 4 milliGRAM(s) IV Push once  polyethylene glycol 3350 17 Gram(s) Oral two times a day  senna 2 Tablet(s) Oral at bedtime  sodium chloride 0.9%. 1000 milliLiter(s) (75 mL/Hr) IV Continuous <Continuous>    MEDICATIONS  (PRN):  acetaminophen   Tablet .. 650 milliGRAM(s) Oral every 6 hours PRN Temp greater or equal to 38C (100.4F), Mild Pain (1 - 3)      PHYSICAL EXAM:    General: No Acute Distress   Neurological: Awake, alert oriented x 3 to person, place and time, Following Commands, PERRL, EOMI, Face Symmetrical, Speech Fluent, Moving all extremities full strength, No Drift, SILT  Pulmonary: Clear to Auscultation, No Rales, No Rhonchi, No Wheezes   Cardiovascular: S1, S2, Regular Rate and Rhythm   Gastrointestinal: Soft, Nontender, Nondistended   Extremities: No calf tenderness       Brain CT:    IMPRESSION:    Brain CT: Unchanged right frontal convexity, left holohemispheric and anterior interhemispheric subdural hematomas. Unchanged 4 mm rightward midline shift.  Maxillofacial bone CT: No acute fracture. HPI:  65 year old male with no known PMHx, presents to the ED c/o worsening b/l temporal and occipital HA over the past 3 days. Pt returned from the [place] 2 weeks ago. 1 week ago, noted "whooshing" sensation to his R ear. Saw an ENT, was given oral abx and otic abx drops but only took about 2 doses. He had a mild headache at the time however, it worsened 3 days ago. He then felt nausea and vomited a few times. Pt also noting dizziness, described as unsteadiness. The wife, who is at bedside, endorsed that pt appeared unsteady when ambulating. He is also endorsing neck pain/stiffness. He denies fevers, chills, diarrhea, cp, sob, numbness, weakness, visual changes (diplopia, blurred vision, loss of vision), abd pain (28 Jun 2021 11:31)      EVENTS:   Admitted to the NSCU for bilateral subdural hematoma  o/n vomiting x2 with severe headache, not resolved when laying flat, CT head done unchanged    VITALS/ LABS/ IMAGING: reviewed     MEDICATIONS  (STANDING):  chlorhexidine 4% Liquid 1 Application(s) Topical <User Schedule>  levETIRAcetam  IVPB 500 milliGRAM(s) IV Intermittent every 12 hours  niCARdipine Infusion 5 mG/Hr (25 mL/Hr) IV Continuous <Continuous>  niMODipine 60 milliGRAM(s) Oral every 4 hours  ondansetron Injectable 4 milliGRAM(s) IV Push once  polyethylene glycol 3350 17 Gram(s) Oral two times a day  senna 2 Tablet(s) Oral at bedtime  sodium chloride 0.9%. 1000 milliLiter(s) (75 mL/Hr) IV Continuous <Continuous>    MEDICATIONS  (PRN):  acetaminophen   Tablet .. 650 milliGRAM(s) Oral every 6 hours PRN Temp greater or equal to 38C (100.4F), Mild Pain (1 - 3)      PHYSICAL EXAM:    General: No Acute Distress   Neurological: Awake, alert oriented x 3 to person, place and time, Following Commands, PERRL, EOMI, Face Symmetrical, Speech Fluent, Moving all extremities full strength, No Drift, SILT  Pulmonary: Clear to Auscultation, No Rales, No Rhonchi, No Wheezes   Cardiovascular: S1, S2, Regular Rate and Rhythm   Gastrointestinal: Soft, Nontender, Nondistended   Extremities: No calf tenderness       Brain CT:    IMPRESSION:    Brain CT: Unchanged right frontal convexity, left holohemispheric and anterior interhemispheric subdural hematomas. Unchanged 4 mm rightward midline shift.  Maxillofacial bone CT: No acute fracture.

## 2021-06-28 NOTE — H&P ADULT - ASSESSMENT
YENNY ROCA    66YO M no PMHx no AC/AP no hx trauma p/w worsening b/l fronto-temporal HA since acute onset HA 3 days ago, last night w/ N/V, +neck pain/stiffness. CTH w/ b/l mixed density SDH and interhemispheric frontal heme, also diffuse sulcal effacement. CTA w/ 1-2mm proximal R A2 aneurysm w/ 1mm neck. Exam: Neuro intact.   - Admit NSCU q1h neuro checks  - SBP<140  - preop for angio tomorrow  - fu ARU  - Repeat CTH pending for 4-6 stability, MRI/MRA pending tonight

## 2021-06-28 NOTE — CHART NOTE - NSCHARTNOTEFT_GEN_A_CORE
CAPRINI SCORE [CLOT] Score on Admission for     AGE RELATED RISK FACTORS                                                       MOBILITY RELATED FACTORS  [ ] Age 41-60 years                                            (1 Point)                  [ ] Bed rest                                                        (1 Point)  [x ] Age: 61-74 years                                           (2 Points)                 [ ] Plaster cast                                                   (2 Points)  [ ] Age= 75 years                                              (3 Points)                 [ ] Bed bound for more than 72 hours                 (2 Points)    DISEASE RELATED RISK FACTORS                                               GENDER SPECIFIC FACTORS  [ ] Edema in the lower extremities                       (1 Point)                  [ ] Pregnancy                                                     (1 Point)  [ ] Varicose veins                                               (1 Point)                  [ ] Post-partum < 6 weeks                                   (1 Point)             [x ] BMI > 25 Kg/m2                                            (1 Point)                  [ ] Hormonal therapy  or oral contraception          (1 Point)                 [ ] Sepsis (in the previous month)                        (1 Point)                  [ ] History of pregnancy complications                 (1 point)  [ ] Pneumonia or serious lung disease                                               [ ] Unexplained or recurrent                     (1 Point)           (in the previous month)                               (1 Point)  [ ] Abnormal pulmonary function test                     (1 Point)                 SURGERY RELATED RISK FACTORS (include planned surgeries)  [ ] Acute myocardial infarction                              (1 Point)                 [ ]  Section                                             (1 Point)  [ ] Congestive heart failure (in the previous month)  (1 Point)         [ ] Minor surgery                                                  (1 Point)   [ ] Inflammatory bowel disease                             (1 Point)                 [ ] Arthroscopic surgery                                        (2 Points)  [ ] Central venous access                                      (2 Points)                [ ] General surgery lasting more than 45 minutes   (2 Points)       [ ] Stroke (in the previous month)                          (5 Points)               [ ] Elective arthroplasty                                         (5 Points)            [ ] current or past malignancy                              (2 Points)                                                                                                       HEMATOLOGY RELATED FACTORS                                                 TRAUMA RELATED RISK FACTORS  [ ] Prior episodes of VTE                                     (3 Points)                [ ] Fracture of the hip, pelvis, or leg                       (5 Points)  [ ] Positive family history for VTE                         (3 Points)                 [ ] Acute spinal cord injury (in the previous month)  (5 Points)  [ ] Prothrombin 84335 A                                     (3 Points)                 [ ] Paralysis  (less than 1 month)                             (5 Points)  [ ] Factor V Leiden                                             (3 Points)                  [ ] Multiple Trauma within 1 month                        (5 Points)  [ ] Lupus anticoagulants                                     (3 Points)                                                           [ ] Anticardiolipin antibodies                               (3 Points)                                                       [ ] High homocysteine in the blood                      (3 Points)                                             [ ] Other congenital or acquired thrombophilia      (3 Points)                                                [ ] Heparin induced thrombocytopenia                  (3 Points)                                          Total Score [     3     ]    Risk:  Very low 0   Low 1 to 2   Moderate 3 to 4   High =5       VTE Prophylasix Recommednations:  [x ] mechanical pneumatic compression devices                                      [ ] contraindicated: _____________________  [ ] chemo prophylasix                                                                                   [ x] contraindicated _____new SAH________________    **** HIGH LIKELIHOOD DVT PRESENT ON ADMISSION  [ ] (please order LE dopplers within 24 hours of admission)

## 2021-06-28 NOTE — PROGRESS NOTE ADULT - ASSESSMENT
A/P:  65 year old male with 2 weeks history of headache and worsening headache today.   CT head showed bilateral subdural hematoma chronic with some acute component with midline shift.  CTA showed right A2 aneurysm  given acute headache 2 weeks ago, possible ruptured aneurysm   no history of trauma     NEURO:  - Neuro checks q 1 hr  - preop for cerebral angiogram in AM  - c/w nimodipine for possible SAH due to ruptured aneurysm  - c/w keppra 500 mg BID for seizure prophylaxis  - c/w tylenol and tramadol prn for pain control  - MRI/MRA brain and total spine tomorrow   - TCDs in AM to monitor for vasospasm    RESPIRATORY: on RA saturating well  - encourage incentive spirometry    CV:   - TTE pending  - keep -140 mmhg, cardene ggt as needed    ENDO: goal euglycemia    HEME/ONC:  - SCDs for DVT ppx  - Chemoppx contraindicated due to hemorrhage    RENAL: NS @ 75 ml/hr  - monitor I/Os    ID: afebrile    GI: NPO after midnight  - c/w zofran prn for n/v  - c/w senna and colace for GI ppx    MISC:   - PT/OT- P    Code Status: Full Code  Disposition: ICU    Patient at high risk for neurologic deterioration, aneurysm rerupture, seizures critical care time, excluding procedures: 35 minutes                          A/P:  65 year old male with 2 weeks history of headache and worsening headache today.   CT head showed bilateral subdural hematoma chronic with some acute component with midline shift.  CTA showed right A2 aneurysm  given acute headache 2 weeks ago, possible ruptured aneurysm   no history of trauma     NEURO:  - CT head done tonight for increased h/a with multiple vomiting episodes, unchanged.   - Neuro checks q 1 hr  - preop for cerebral angiogram in AM  - c/w nimodipine for possible SAH due to ruptured aneurysm  - c/w keppra 500 mg BID for seizure prophylaxis  - c/w tylenol and tramadol prn for pain control  - MRI/MRA brain and total spine tomorrow   - TCDs in AM to monitor for vasospasm    RESPIRATORY: on 2L cannula saturating well, transient desaturation episodes when on RA, f/u CXR  - encourage incentive spirometry    CV:   - TTE pending  - keep -140 mmhg, cardene ggt as needed    ENDO: goal euglycemia    HEME/ONC:  - SCDs for DVT ppx  - Chemoppx contraindicated due to hemorrhage    RENAL: NS @ 75 ml/hr  - monitor I/Os    ID: afebrile    GI: NPO after midnight  - c/w zofran prn for n/v  - c/w senna and colace for GI ppx    MISC:   - PT/OT- P    Code Status: Full Code  Disposition: ICU    Patient at high risk for neurologic deterioration, aneurysm rerupture, seizures critical care time, excluding procedures: 35 minutes

## 2021-06-28 NOTE — H&P ADULT - NSHPLABSRESULTS_GEN_ALL_CORE
CT brain:  Bilateral mixed density lateral convexity subdural collections measuring up to 7 mm in greatest depth on the left and 6 mm on the right.    Acute subdural hemorrhage in the anterior interhemispheric fissure measuring 5 mm in greatest depth.    Rightward midline shift of 4 mm. Partial effacement of the basal cisterns. No hydrocephalus.    No parenchymal hemorrhage or brain edema.    CTA brain:  No flow-limiting stenosis. No AVM.    1 mm superiorly projecting proximal right A2 segment aneurysm. The neck measures 1 mm in size.    Periapical lucency involves the left second mandibular molar and right second maxillary premolar. This may represent the presence of periapical/periodontal disease.    CTA neck:  No flow-limiting stenosis.  No evidence for arterial dissection.

## 2021-06-29 LAB
A1C WITH ESTIMATED AVERAGE GLUCOSE RESULT: 5.8 % — HIGH (ref 4–5.6)
ANION GAP SERPL CALC-SCNC: 11 MMOL/L — SIGNIFICANT CHANGE UP (ref 5–17)
ANION GAP SERPL CALC-SCNC: 11 MMOL/L — SIGNIFICANT CHANGE UP (ref 5–17)
BUN SERPL-MCNC: 12 MG/DL — SIGNIFICANT CHANGE UP (ref 7–23)
BUN SERPL-MCNC: 14 MG/DL — SIGNIFICANT CHANGE UP (ref 7–23)
CALCIUM SERPL-MCNC: 8.4 MG/DL — SIGNIFICANT CHANGE UP (ref 8.4–10.5)
CALCIUM SERPL-MCNC: 8.9 MG/DL — SIGNIFICANT CHANGE UP (ref 8.4–10.5)
CHLORIDE SERPL-SCNC: 100 MMOL/L — SIGNIFICANT CHANGE UP (ref 96–108)
CHLORIDE SERPL-SCNC: 103 MMOL/L — SIGNIFICANT CHANGE UP (ref 96–108)
CHOLEST SERPL-MCNC: 266 MG/DL — HIGH
CO2 SERPL-SCNC: 24 MMOL/L — SIGNIFICANT CHANGE UP (ref 22–31)
CO2 SERPL-SCNC: 24 MMOL/L — SIGNIFICANT CHANGE UP (ref 22–31)
COVID-19 SPIKE DOMAIN AB INTERP: POSITIVE
COVID-19 SPIKE DOMAIN ANTIBODY RESULT: >250 U/ML — HIGH
CREAT SERPL-MCNC: 0.62 MG/DL — SIGNIFICANT CHANGE UP (ref 0.5–1.3)
CREAT SERPL-MCNC: 0.66 MG/DL — SIGNIFICANT CHANGE UP (ref 0.5–1.3)
ESTIMATED AVERAGE GLUCOSE: 120 MG/DL — HIGH (ref 68–114)
GLUCOSE SERPL-MCNC: 141 MG/DL — HIGH (ref 70–99)
GLUCOSE SERPL-MCNC: 156 MG/DL — HIGH (ref 70–99)
HCT VFR BLD CALC: 39 % — SIGNIFICANT CHANGE UP (ref 39–50)
HDLC SERPL-MCNC: 39 MG/DL — LOW
HGB BLD-MCNC: 13.1 G/DL — SIGNIFICANT CHANGE UP (ref 13–17)
LIPID PNL WITH DIRECT LDL SERPL: 209 MG/DL — HIGH
MAGNESIUM SERPL-MCNC: 2 MG/DL — SIGNIFICANT CHANGE UP (ref 1.6–2.6)
MAGNESIUM SERPL-MCNC: 2.1 MG/DL — SIGNIFICANT CHANGE UP (ref 1.6–2.6)
MCHC RBC-ENTMCNC: 30.8 PG — SIGNIFICANT CHANGE UP (ref 27–34)
MCHC RBC-ENTMCNC: 33.6 GM/DL — SIGNIFICANT CHANGE UP (ref 32–36)
MCV RBC AUTO: 91.8 FL — SIGNIFICANT CHANGE UP (ref 80–100)
NON HDL CHOLESTEROL: 227 MG/DL — HIGH
NRBC # BLD: 0 /100 WBCS — SIGNIFICANT CHANGE UP (ref 0–0)
PHOSPHATE SERPL-MCNC: 2.5 MG/DL — SIGNIFICANT CHANGE UP (ref 2.5–4.5)
PHOSPHATE SERPL-MCNC: 3.1 MG/DL — SIGNIFICANT CHANGE UP (ref 2.5–4.5)
PLATELET # BLD AUTO: 190 K/UL — SIGNIFICANT CHANGE UP (ref 150–400)
POTASSIUM SERPL-MCNC: 3.7 MMOL/L — SIGNIFICANT CHANGE UP (ref 3.5–5.3)
POTASSIUM SERPL-MCNC: 4.1 MMOL/L — SIGNIFICANT CHANGE UP (ref 3.5–5.3)
POTASSIUM SERPL-SCNC: 3.7 MMOL/L — SIGNIFICANT CHANGE UP (ref 3.5–5.3)
POTASSIUM SERPL-SCNC: 4.1 MMOL/L — SIGNIFICANT CHANGE UP (ref 3.5–5.3)
RBC # BLD: 4.25 M/UL — SIGNIFICANT CHANGE UP (ref 4.2–5.8)
RBC # FLD: 12.6 % — SIGNIFICANT CHANGE UP (ref 10.3–14.5)
SARS-COV-2 IGG+IGM SERPL QL IA: >250 U/ML — HIGH
SARS-COV-2 IGG+IGM SERPL QL IA: POSITIVE
SODIUM SERPL-SCNC: 135 MMOL/L — SIGNIFICANT CHANGE UP (ref 135–145)
SODIUM SERPL-SCNC: 138 MMOL/L — SIGNIFICANT CHANGE UP (ref 135–145)
T3 SERPL-MCNC: 59 NG/DL — LOW (ref 80–200)
T4 AB SER-ACNC: 5.5 UG/DL — SIGNIFICANT CHANGE UP (ref 4.6–12)
TRIGL SERPL-MCNC: 87 MG/DL — SIGNIFICANT CHANGE UP
TSH SERPL-MCNC: 0.21 UIU/ML — LOW (ref 0.27–4.2)
WBC # BLD: 15.01 K/UL — HIGH (ref 3.8–10.5)
WBC # FLD AUTO: 15.01 K/UL — HIGH (ref 3.8–10.5)

## 2021-06-29 PROCEDURE — 75898 FOLLOW-UP ANGIOGRAPHY: CPT | Mod: 26,59

## 2021-06-29 PROCEDURE — 36224 PLACE CATH CAROTD ART: CPT | Mod: 50,59

## 2021-06-29 PROCEDURE — 36227 PLACE CATH XTRNL CAROTID: CPT

## 2021-06-29 PROCEDURE — 70544 MR ANGIOGRAPHY HEAD W/O DYE: CPT | Mod: 26,59

## 2021-06-29 PROCEDURE — 75894 X-RAYS TRANSCATH THERAPY: CPT | Mod: 26,59

## 2021-06-29 PROCEDURE — 93306 TTE W/DOPPLER COMPLETE: CPT | Mod: 26

## 2021-06-29 PROCEDURE — 99291 CRITICAL CARE FIRST HOUR: CPT

## 2021-06-29 PROCEDURE — 36226 PLACE CATH VERTEBRAL ART: CPT | Mod: 50

## 2021-06-29 PROCEDURE — 61624 TCAT PERM OCCLS/EMBOLJ CNS: CPT | Mod: 59

## 2021-06-29 PROCEDURE — 76377 3D RENDER W/INTRP POSTPROCES: CPT | Mod: 26

## 2021-06-29 PROCEDURE — 76498 UNLISTED MR PROCEDURE: CPT | Mod: 26

## 2021-06-29 PROCEDURE — 70553 MRI BRAIN STEM W/O & W/DYE: CPT | Mod: 26

## 2021-06-29 RX ORDER — LEVETIRACETAM 250 MG/1
500 TABLET, FILM COATED ORAL EVERY 12 HOURS
Refills: 0 | Status: DISCONTINUED | OUTPATIENT
Start: 2021-06-29 | End: 2021-07-01

## 2021-06-29 RX ORDER — METOCLOPRAMIDE HCL 10 MG
10 TABLET ORAL ONCE
Refills: 0 | Status: COMPLETED | OUTPATIENT
Start: 2021-06-29 | End: 2021-06-28

## 2021-06-29 RX ORDER — ACETAMINOPHEN 500 MG
1000 TABLET ORAL ONCE
Refills: 0 | Status: COMPLETED | OUTPATIENT
Start: 2021-06-29 | End: 2021-06-29

## 2021-06-29 RX ORDER — ATORVASTATIN CALCIUM 80 MG/1
40 TABLET, FILM COATED ORAL AT BEDTIME
Refills: 0 | Status: DISCONTINUED | OUTPATIENT
Start: 2021-06-29 | End: 2021-07-04

## 2021-06-29 RX ORDER — HYDROMORPHONE HYDROCHLORIDE 2 MG/ML
0.25 INJECTION INTRAMUSCULAR; INTRAVENOUS; SUBCUTANEOUS ONCE
Refills: 0 | Status: DISCONTINUED | OUTPATIENT
Start: 2021-06-29 | End: 2021-06-29

## 2021-06-29 RX ORDER — PROCHLORPERAZINE MALEATE 5 MG
10 TABLET ORAL ONCE
Refills: 0 | Status: COMPLETED | OUTPATIENT
Start: 2021-06-29 | End: 2021-06-29

## 2021-06-29 RX ORDER — POTASSIUM CHLORIDE 20 MEQ
40 PACKET (EA) ORAL ONCE
Refills: 0 | Status: DISCONTINUED | OUTPATIENT
Start: 2021-06-29 | End: 2021-06-29

## 2021-06-29 RX ORDER — HYDROMORPHONE HYDROCHLORIDE 2 MG/ML
0.25 INJECTION INTRAMUSCULAR; INTRAVENOUS; SUBCUTANEOUS EVERY 4 HOURS
Refills: 0 | Status: DISCONTINUED | OUTPATIENT
Start: 2021-06-29 | End: 2021-06-29

## 2021-06-29 RX ORDER — MILRINONE LACTATE 1 MG/ML
0.12 INJECTION, SOLUTION INTRAVENOUS
Qty: 20 | Refills: 0 | Status: DISCONTINUED | OUTPATIENT
Start: 2021-06-29 | End: 2021-06-29

## 2021-06-29 RX ORDER — ACETAMINOPHEN 500 MG
1000 TABLET ORAL ONCE
Refills: 0 | Status: COMPLETED | OUTPATIENT
Start: 2021-06-29 | End: 2021-06-30

## 2021-06-29 RX ORDER — NICARDIPINE HYDROCHLORIDE 30 MG/1
3 CAPSULE, EXTENDED RELEASE ORAL
Qty: 40 | Refills: 0 | Status: DISCONTINUED | OUTPATIENT
Start: 2021-06-29 | End: 2021-06-30

## 2021-06-29 RX ORDER — POTASSIUM CHLORIDE 20 MEQ
10 PACKET (EA) ORAL
Refills: 0 | Status: COMPLETED | OUTPATIENT
Start: 2021-06-29 | End: 2021-06-30

## 2021-06-29 RX ORDER — DEXAMETHASONE 0.5 MG/5ML
4 ELIXIR ORAL EVERY 6 HOURS
Refills: 0 | Status: COMPLETED | OUTPATIENT
Start: 2021-06-29 | End: 2021-06-30

## 2021-06-29 RX ADMIN — SODIUM CHLORIDE 75 MILLILITER(S): 9 INJECTION INTRAMUSCULAR; INTRAVENOUS; SUBCUTANEOUS at 17:40

## 2021-06-29 RX ADMIN — HYDROMORPHONE HYDROCHLORIDE 0.25 MILLIGRAM(S): 2 INJECTION INTRAMUSCULAR; INTRAVENOUS; SUBCUTANEOUS at 23:35

## 2021-06-29 RX ADMIN — Medication 100 MILLIEQUIVALENT(S): at 22:56

## 2021-06-29 RX ADMIN — HYDROMORPHONE HYDROCHLORIDE 0.25 MILLIGRAM(S): 2 INJECTION INTRAMUSCULAR; INTRAVENOUS; SUBCUTANEOUS at 19:40

## 2021-06-29 RX ADMIN — Medication 400 MILLIGRAM(S): at 18:25

## 2021-06-29 RX ADMIN — Medication 4 MILLIGRAM(S): at 23:38

## 2021-06-29 RX ADMIN — LEVETIRACETAM 400 MILLIGRAM(S): 250 TABLET, FILM COATED ORAL at 17:39

## 2021-06-29 RX ADMIN — HYDROMORPHONE HYDROCHLORIDE 0.25 MILLIGRAM(S): 2 INJECTION INTRAMUSCULAR; INTRAVENOUS; SUBCUTANEOUS at 23:20

## 2021-06-29 RX ADMIN — Medication 1000 MILLIGRAM(S): at 18:30

## 2021-06-29 RX ADMIN — CHLORHEXIDINE GLUCONATE 1 APPLICATION(S): 213 SOLUTION TOPICAL at 22:08

## 2021-06-29 RX ADMIN — HYDROMORPHONE HYDROCHLORIDE 0.25 MILLIGRAM(S): 2 INJECTION INTRAMUSCULAR; INTRAVENOUS; SUBCUTANEOUS at 19:55

## 2021-06-29 RX ADMIN — Medication 10 MILLIGRAM(S): at 23:37

## 2021-06-29 RX ADMIN — LEVETIRACETAM 400 MILLIGRAM(S): 250 TABLET, FILM COATED ORAL at 05:57

## 2021-06-29 NOTE — PHYSICAL THERAPY INITIAL EVALUATION ADULT - PERTINENT HX OF CURRENT PROBLEM, REHAB EVAL
no known PMHx. presents to ED c/o worsening bilateral temporal & occipital HA x3 days.  1wk ago, noted "whooshing" sensation R ear. Saw an ENT, was given oral abx & otic abx drops but only took ~2 doses. He had this headache & it was mild however, 3 days ago, it has worsened. He then felt nausea & vomited a few times. Pt also noting dizziness, described as unsteadiness & wife endorsed unsteady gait. Pt c/o neck pain/stiffness. CTH: bilateral SDH & acute subdural hemorrhage no known PMHx. presents to ED c/o worsening bilateral temporal & occipital HA x3 days.  1wk ago, noted "whooshing" sensation R ear. Saw an ENT, was given oral abx & otic abx drops but only took ~2 doses. He had this headache & it was mild however, 3 days ago, it has worsened. He then felt nausea & vomited a few times. Pt also noting dizziness, described as unsteadiness & wife endorsed unsteady gait. Pt c/o neck pain/stiffness. CTH: bilateral SDH & acute subdural hemorrhage. cont...

## 2021-06-29 NOTE — PROGRESS NOTE ADULT - ASSESSMENT
A/P:  66 yo M  2 weeks history of headache with worsening headach today.  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  CT head showed bilateral subdural hematoma chronic with some acute component with midline shift   CTA showed right A 2 aneurysm   given acute headache 2 weeks ago, s/o rupture aneurysm   no history of trauma     Neuro: neuro checks q 1 hr, CT head tomorrow morning, angio tomorrow, nimodipine, keppra 500 mg BID for seizure prophylaxis  tylenol and tramadol   MRI brain today    TCD -P  Angio today   Respiratory: RA   CV:  Bradycardia - 1.5 - 2 sec pauses - hemodynically stable ;  NSR TTE, -140 mmhg  Endocrine: glucose stable no FS   Heme/Onc: INR <1.5, Plt>100            DVT ppx: SCD, contraindicated to start anticoagulant as she is PBD #1  Doppler B/L LE    Renal: NS 75 ml/hr  ID: afebrile  GI: NPO, ,, colace   Social/Family: updated at bedside  Discharge planning: ICU    Code Status: [x] Full Code [] DNR [] DNI [] Goals of Care:   Disposition: [x] ICU [] Stroke Unit [] RCU []PCU []Floor [] Discharge Home     Patient at high risk for neurologic deterioration, aneurysm rerupture, seizures  critical care time, excluding procedures: 40 minutes         A/P:  64 yo M  2 weeks history of headache with worsening headach today.  CT head showed bilateral subdural hematoma chronic with some acute component with midline shift   CTA showed right A 2 aneurysm   given acute headache 2 weeks ago, s/o rupture aneurysm   no history of trauma     Neuro: neuro checks q 1 hr, CT head tomorrow morning, angio tomorrow, nimodipine, keppra 500 mg BID for seizure prophylaxis  tylenol and tramadol   MRI brain today  Nimodipine held due to bradycardia - therefore d/C     TCD -P  Angio today   Respiratory: RA   CV:  Bradycardia - 1.5 - 2 sec pauses - hemodynically stable ;  NSR TTE, -140 mmhg  Endocrine: glucose stable no FS   Heme/Onc: INR <1.5, Plt>100            DVT ppx: SCD, contraindicated to start anticoagulant as she is PBD #1  Doppler B/L LE    Renal: NS 75 ml/hr  ID: afebrile  GI: NPO, ,, colace   Social/Family: updated at bedside  Discharge planning: ICU    Code Status: [x] Full Code [] DNR [] DNI [] Goals of Care:   Disposition: [x] ICU [] Stroke Unit [] RCU []PCU []Floor [] Discharge Home     Patient at high risk for neurologic deterioration, aneurysm rerupture, seizures  critical care time, excluding procedures: 40 minutes

## 2021-06-29 NOTE — PHYSICAL THERAPY INITIAL EVALUATION ADULT - PRECAUTIONS/LIMITATIONS, REHAB EVAL
CT brain: Bilateral mixed density lateral convexity subdural collections, Acute subdural hemorrhage in the anterior interhemispheric fissure, Rightward midline shift, Partial effacement of the basal cisterns. No hydrocephalus,No parenchymal hemorrhage or brain edema. CTA brain: 1mm superiorly projecting proximal R A2 segment aneurysm. The neck measures 1 mm in size. Periapical lucency involves the L second mandibular molar & R second maxillary premolar. This may represent the presence of periapical/periodontal disease. CTA neck: neg s/p bilateral MMA embolization & right M3 thrombectomy, w/ intergrillin & Milrinone given 6/29./fall precautions

## 2021-06-29 NOTE — PROGRESS NOTE ADULT - SUBJECTIVE AND OBJECTIVE BOX
Patient seen and examined     AAOx3, EOMI, PERRL, no drift, LOW 5/5, significant HA    T(C): 36.7 (06-28-21 @ 19:00), Max: 36.9 (06-28-21 @ 07:15)  HR: 58 (06-28-21 @ 22:00) (54 - 79)  BP: 137/77 (06-28-21 @ 14:00) (123/67 - 159/94)  RR: 13 (06-28-21 @ 22:00) (12 - 21)  SpO2: 94% (06-28-21 @ 22:00) (92% - 100%)                          13.1   11.07 )-----------( 199      ( 28 Jun 2021 21:10 )             38.2     06-28    134<L>  |  101  |  13  ----------------------------<  134<H>  3.8   |  23  |  0.59    Ca    8.5      28 Jun 2021 21:10  Phos  3.0     06-28  Mg     2.0     06-28    TPro  7.5  /  Alb  4.3  /  TBili  0.3  /  DBili  x   /  AST  13  /  ALT  16  /  AlkPhos  78  06-28    PT/INR - ( 28 Jun 2021 09:54 )   PT: 12.5 sec;   INR: 1.04 ratio         PTT - ( 28 Jun 2021 09:54 )  PTT:23.9 sec        CAPILLARY BLOOD GLUCOSE      POCT Blood Glucose.: 141 mg/dL (28 Jun 2021 06:43)

## 2021-06-29 NOTE — CHART NOTE - NSCHARTNOTEFT_GEN_A_CORE
Interventional Neuro- Radiology   Procedure Note      This is a 64 yo M with no known PMHx, presents c/o worsening b/l temporal and occipital HA over the past 3 days. [place] from [place] 2 [place] ago. 1 week ago, noted "whooshing" sensation to his R ear. Saw an ENT, was given oral abx and otic abx drops but only took about 2 doses. He had this headache and it was mild however, 3 days ago, it has worsened. He then felt nausea and vomited a few times. Pt also noting dizziness, described as unsteadiness. The wife, who is at bedside, endorsed that pt appeared unsteady when ambulating. He is also endorsing neck pain/stiffness. He denies fevers, chills, diarrhea, cp, sob, numbness, weakness, visual changes (diplopia, blurred vision, loss of vision), abd pain. CT head showed bilateral subdural hematoma chronic with some acute component with midline shift, CTA showed right A 2 aneurysm. Patient presents today to neuro IR for selective cerebral angiography and possible embolization of vascular malformation.     Neuro Exam: AAOx3, EOMI, PERRL, no drift, LOW 5/5, significant HA    Past Medical and Surgical History:  None    LABS:                      13.1   11.07 )-----------( 199      ( 28 Jun 2021 21:10 )             38.2     06-28    134<L>  |  101  |  13  ----------------------------<  134<H>  3.8   |  23  |  0.59    Ca    8.5      28 Jun 2021 21:10  Phos  3.0     06-28  Mg     2.0     06-28    TPro  7.5  /  Alb  4.3  /  TBili  0.3  /  DBili  x   /  AST  13  /  ALT  16  /  AlkPhos  78  06-28    PT/INR - ( 28 Jun 2021 09:54 )   PT: 12.5 sec;   INR: 1.04 ratio      This is a 66yo male who presents with bilateral subdurals and possible right A2 aneurysm. Patient presents now to neuro IR for selective cerebral angiography and possible embolization. Procedure/ risks benefits were explained, informed consent obtained from patient, verbalizes understanding.     Myriam Ahmadi PA-C  [phone]

## 2021-06-29 NOTE — PROGRESS NOTE ADULT - SUBJECTIVE AND OBJECTIVE BOX
HPI:  66 yo M with no known PMHx, presents to the ED c/o worsening b/l temporal and occipital HA over the past 3 days. Pt returned from the [place] 2 weeks ago. 1 week ago, noted "whooshing" sensation to his R ear. Saw an ENT, was given oral abx and otic abx drops but only took about 2 doses. He had this headache and it was mild however, 3 days ago, it has worsened. He then felt nausea and vomited a few times. Pt also noting dizziness, described as unsteadiness. The wife, who is at bedside, endorsed that pt appeared unsteady when ambulating. He is also endorsing neck pain/stiffness. He denies fevers, chills, diarrhea, cp, sob, numbness, weakness, visual changes (diplopia, blurred vision, loss of vision), abd pain (28 Jun 2021 11:31)      EVENTS:   Admitted to the NSCU for bilateral subdural hematoma    ICU Vital Signs Last 24 Hrs  T(C): 36.9 (29 Jun 2021 03:00), Max: 36.9 (28 Jun 2021 09:30)  T(F): 98.5 (29 Jun 2021 03:00), Max: 98.5 (28 Jun 2021 09:30)  HR: 65 (29 Jun 2021 06:00) (53 - 79)  BP: 137/77 (28 Jun 2021 14:00) (123/67 - 153/78)  BP(mean): 95 (28 Jun 2021 14:00) (88 - 99)  ABP: 110/73 (29 Jun 2021 06:00) (109/70 - 146/52)  ABP(mean): 87 (29 Jun 2021 06:00) (66 - 92)  RR: 17 (29 Jun 2021 06:00) (12 - 21)  SpO2: 98% (29 Jun 2021 06:00) (92% - 100%)         28 Jun 2021 07:01  -  29 Jun 2021 07:00  --------------------------------------------------------  IN:    IV PiggyBack: 400 mL    NiCARdipine: 100 mL    Oral Fluid: 50 mL    sodium chloride 0.9%: 1275 mL  Total IN: 1825 mL    OUT:    Voided (mL): 850 mL  Total OUT: 850 mL    Total NET: 975 mL                          13.1   11.07 )-----------( 199      ( 28 Jun 2021 21:10 )             38.2       06-29    135  |  100  |  14  ----------------------------<  156<H>  4.1   |  24  |  0.62    Ca    8.9      29 Jun 2021 03:10  Phos  3.1     06-29  Mg     2.1     06-29    TPro  7.5  /  Alb  4.3  /  TBili  0.3  /  DBili  x   /  AST  13  /  ALT  16  /  AlkPhos  78  06-28      Xray Chest 1 View- PORTABLE-Urgent (Xray Chest 1 View- PORTABLE-Urgent .) (06.28.21 @ 23:10) >  IMPRESSION:    Poor inspiratory effort is normal in size. The lungs are clear. No pleural effusion. No pneumothorax. Degenerative changes of the thoracic spine.     CT Maxillofacial No Cont (06.28.21 @ 13:04) >  IMPRESSION:    Brain CT: Unchanged right frontal convexity, left holohemispheric and anterior interhemispheric subdural hematomas. Unchanged 4 mm rightward midline shift.    Maxillofacial bone CT: No acute fracture.                    PHYSICAL EXAM:    General: No Acute Distress     Neurological: Awake, alert oriented to person, place and time, Following Commands, PERRL, EOMI, Face Symmetrical, Speech Fluent, Moving all extremities, Muscle Strength normal in all four extremities, No Drift, Sensation to Light Touch Intact    Pulmonary: Clear to Auscultation, No Rales, No Rhonchi, No Wheezes     Cardiovascular: S1, S2, Regular Rate and Rhythm     Gastrointestinal: Soft, Nontender, Nondistended     Extremities: No calf tenderness                                     HPI:  64 yo M with no known PMHx, presents to the ED c/o worsening b/l temporal and occipital HA over the past 3 days. Pt returned from the [place] 2 weeks ago. 1 week ago, noted "whooshing" sensation to his R ear. Saw an ENT, was given oral abx and otic abx drops but only took about 2 doses. He had this headache and it was mild however, 3 days ago, it has worsened. He then felt nausea and vomited a few times. Pt also noting dizziness, described as unsteadiness. The wife, who is at bedside, endorsed that pt appeared unsteady when ambulating. He is also endorsing neck pain/stiffness. He denies fevers, chills, diarrhea, cp, sob, numbness, weakness, visual changes (diplopia, blurred vision, loss of vision), abd pain (28 Jun 2021 11:31)      EVENTS:   Admitted to the NSCU for bilateral subdural hematoma    ICU Vital Signs Last 24 Hrs  T(C): 36.9 (29 Jun 2021 03:00), Max: 36.9 (28 Jun 2021 09:30)  T(F): 98.5 (29 Jun 2021 03:00), Max: 98.5 (28 Jun 2021 09:30)  HR: 65 (29 Jun 2021 06:00) (53 - 79)  BP: 137/77 (28 Jun 2021 14:00) (123/67 - 153/78)  BP(mean): 95 (28 Jun 2021 14:00) (88 - 99)  ABP: 110/73 (29 Jun 2021 06:00) (109/70 - 146/52)  ABP(mean): 87 (29 Jun 2021 06:00) (66 - 92)  RR: 17 (29 Jun 2021 06:00) (12 - 21)  SpO2: 98% (29 Jun 2021 06:00) (92% - 100%)         28 Jun 2021 07:01  -  29 Jun 2021 07:00  --------------------------------------------------------  IN:    IV PiggyBack: 400 mL    NiCARdipine: 100 mL    Oral Fluid: 50 mL    sodium chloride 0.9%: 1275 mL  Total IN: 1825 mL    OUT:    Voided (mL): 850 mL  Total OUT: 850 mL    Total NET: 975 mL                          13.1   11.07 )-----------( 199      ( 28 Jun 2021 21:10 )             38.2       06-29    135  |  100  |  14  ----------------------------<  156<H>  4.1   |  24  |  0.62    Ca    8.9      29 Jun 2021 03:10  Phos  3.1     06-29  Mg     2.1     06-29    TPro  7.5  /  Alb  4.3  /  TBili  0.3  /  DBili  x   /  AST  13  /  ALT  16  /  AlkPhos  78  06-28      Xray Chest 1 View- PORTABLE-Urgent (Xray Chest 1 View- PORTABLE-Urgent .) (06.28.21 @ 23:10) >  IMPRESSION:    Poor inspiratory effort is normal in size. The lungs are clear. No pleural effusion. No pneumothorax. Degenerative changes of the thoracic spine.     CT Maxillofacial No Cont (06.28.21 @ 13:04) >  IMPRESSION:    Brain CT: Unchanged right frontal convexity, left holohemispheric and anterior interhemispheric subdural hematomas. Unchanged 4 mm rightward midline shift.    Maxillofacial bone CT: No acute fracture.                    PHYSICAL EXAM:    General: No Acute Distress     Neurological: Drowsy, alert oriented to person, place and time, Following Commands, PERRL, EOMI, Face Symmetrical, Speech Fluent, Moving all extremities, Muscle Strength normal in all four extremities, No Drift, Sensation to Light Touch Intact    Pulmonary: Clear to Auscultation, No Rales, No Rhonchi, No Wheezes     Cardiovascular: S1, S2, Regular Rate and Rhythm     Gastrointestinal: Soft, Nontender, Nondistended     Extremities: No calf tenderness

## 2021-06-29 NOTE — PROVIDER CONTACT NOTE (CHANGE IN STATUS NOTIFICATION) - ASSESSMENT
Pt complaining of unrelieved 10/10 headache despite mult. different pain medications from 1930 to 2315, emesis count after PO meds, mult. episodes unsustained bradycardia to 35, normotensive, desating to 85 when nasal cannula off, A+O x 4, full strength, pupils 2RB b/l

## 2021-06-29 NOTE — CHART NOTE - NSCHARTNOTEFT_GEN_A_CORE
Interventional Neuro- Radiology   Procedure Note      Procedure: Selective Cerebral Angiography and embolization   Pre- Procedure Diagnosis: Bilateral Subdurals  Post- Procedure Diagnosis: s/p bilateral MMA embolization     : Dr. Chace MD  Fellow: Dr. Leodan MD  NP: Melva Vee    RN: Carey Johnson: Lg    Anesthesia: Dr. Jigar MD   (general anesthesia)    I/Os:  Fluids:  [place]:  Contrast:  Estimated Blood Loss: <10cc    Preliminary Report:  Under general anesthesia, using a ___Fr short/long sheath to the right groin examination of left vertebral artery/ left internal carotid artery/ left external carotid artery/ right vertebral artery/ right internal carotid artery/ right external carotid artery via selective cerebral angiography demonstrates ________. ( Official note to follow).    Patient tolerated procedure well, vital signs stable, hemodynamically stable, no change in neurological status compared to baseline. Results discussed with neurosurgery/ patient and their family. Groin sheath d/c'ed, manual compression held to hemostasis, no active bleeding, no hematoma, Vascade applied, quick clot and safeguard balloon dressing applied at _____h. Patient transferred to PACU for further care/ monitoring. Interventional Neuro- Radiology   Procedure Note      Procedure: Selective Cerebral Angiography and embolization   Pre- Procedure Diagnosis: Bilateral Subdurals  Post- Procedure Diagnosis: s/p bilateral MMA embolization     : Dr. Chace MD  Fellow: Dr. Leodan MD  NP: Melva Vee    RN: Carey  Tech: Lg    Anesthesia: Dr. Jigar MD   (general anesthesia)    I/Os:  Fluids: 1200  Puente: 1000  Contrast: 163  Estimated Blood Loss: <10cc    Preliminary Report:  Under general anesthesia, using a 6 Chinese arrow flex sheath to the right groin examination of left vertebral artery/ left internal carotid artery/ left external carotid artery/ right vertebral artery/ right internal carotid artery/ right external carotid artery via selective cerebral angiography.  Underwent left mma embo with particles. Underwent right mma embo with coils.  Underwent rmca thrombectomy and intra arterial administration of Integrilin, nicardipine, and milrinone. ( Official note to follow).    Patient tolerated procedure well, vital signs stable, hemodynamically stable, no change in neurological status compared to baseline. Results discussed with neurosurgery/ patient and their family. Groin sheath d/c'ed, vascade device deployed, manual compression held to hemostasis, no active bleeding, no hematoma, quick clot and safeguard balloon dressing applied at 1700_h. Patient transferred back to NSCU. Interventional Neuro- Radiology   Procedure Note      Procedure: Selective Cerebral Angiography and embolization   Pre- Procedure Diagnosis: Bilateral Subdurals  Post- Procedure Diagnosis: s/p bilateral MMA embolization     : Dr. Chace MD  Fellow: Dr. Leodan MD  NP: Melva Vee    RN: Carey  Tech: Lg    Anesthesia: Dr. Jigar MD   (general anesthesia)    I/Os:  Fluids: 1200  Puente: 1000  Contrast: 163  Estimated Blood Loss: <10cc    Preliminary Report:  Under general anesthesia, using a 6 Bengali arrow flex sheath to the right groin examination of left vertebral artery/ left internal carotid artery/ left external carotid artery/ right vertebral artery/ right internal carotid artery/ right external carotid artery via selective cerebral angiography.  Underwent left mma embo with particles. Underwent right mma embo with coils.  Underwent rmca thrombectomy and intra arterial administration of Integrilin, nicardipine, and milrinone. ( Official note to follow).    Patient tolerated procedure well, vital signs stable, hemodynamically stable, left upper extremity no movement. Results discussed with neurosurgery. Groin sheath d/c'ed, vascade device deployed, manual compression held to hemostasis, no active bleeding, no hematoma, quick clot and safeguard balloon dressing applied at 1700_h. Patient transferred back to NSCU.

## 2021-06-29 NOTE — PROVIDER CONTACT NOTE (CHANGE IN STATUS NOTIFICATION) - ACTION/TREATMENT ORDERED:
Providers assessed pt at bedside, CT scan stable, see flow sheet for pain medication given, decadron and compazine given with additional dilaudid, zofran and reglan given for emesis, pain relief achieved at 0000, EKG normal, atropine at bedside, labs sent, no further interventions, will continue to monitor.

## 2021-06-29 NOTE — PROGRESS NOTE ADULT - ASSESSMENT
ASSESSMENT AND PLAN: 65M, no significant medical history, p/w worsening HA x 3 days prior to admission, found to have b/l mixed density SDH, and on CTA H/N incidentally found to have 1-2mm proximal RT A1 aneurysm w/ 1mm neck    6/29 s/p angio: b/l MMA embolization, Rt M3 thrombectomy w/ integrillin and milrinone    NEURO:   - Continue neuro q 1 checks  - B/L SDH - s/p angio: b/l MMA embo, RT M3 thrombectomy w/ integrellin and milrinone  - MRI Brain and Spine done - effacement of suprasellar cistern, hypothalamus appears to be sagging, normal intracranial MRA - no aneurysms  - CT Maxillofacial done on 6/28 no acute fracture  - Continue Keppra for seizure ppx  - Endorsing HAs, though improved in comparison from yesterday night, continue tylenol prn and tramadol prn, added Dilaudid 0.25 prn, Decadron 4q6 x 4 doses  - Repeat CT Brain in am    PULM:   - On 4L NC, O2Sat>95%, wean to room air  - Encourage incentive spirometry    CV:  - -160  - Was on Cardene, weaned off  - , HDL 39, Cholesterol 266, Lipitor 40 started  - TTE - p    ENDO:   - HgbA1c 5.8  - Monitor glucose while on short course of steroids  - TSH 0.21 - mildly low    HEME/ONC:             Leukocytosis noted on this evening labs, afebrile, likely from post-angio and steroids         DVT ppx: SCDs, [place] - P, if CT Brain in am, ?poss start DVT ppx    RENAL:   - Currently on NS@75. IVL once appropriate oral intake  - Monitor electrolytes, supplement as needed  - D/C bismark    ID:   - Afebrile, monitor for fevers    GI:   - Bedside dysphagia, post-angio  - Continue senna and miralax for bowel regimen      CODE STATUS: FULL CODE    DISPO: ICU    TIME SPENT 35 MINS    Patient is at risk for neurological deterioration.

## 2021-06-29 NOTE — PROGRESS NOTE ADULT - ASSESSMENT
Patient seen and examined at bedside.  Exam: AAOx3, EOMI, PERRL, no drift, LOW 5/5, significant HA  -HA not improved lying flat  -Repeat CTH done for worse HA/N/V, stable  -MRI/MRA AM, MR spine   -Poss angio/embo for aneurysm AM

## 2021-06-29 NOTE — PROGRESS NOTE ADULT - SUBJECTIVE AND OBJECTIVE BOX
SUBJECTIVE: HPI:  64 yo M with no known PMHx, presents to the ED c/o worsening b/l temporal and occipital HA over the past 3 days. Pt returned from the [place] 2 weeks ago. 1 week ago, noted "whooshing" sensation to his R ear. Saw an ENT, was given oral abx and otic abx drops but only took about 2 doses. He had this headache and it was mild however, 3 days ago, it has worsened. He then felt nausea and vomited a few times. Pt also noting dizziness, described as unsteadiness. The wife, who is at bedside, endorsed that pt appeared unsteady when ambulating. He is also endorsing neck pain/stiffness. He denies fevers, chills, diarrhea, cp, sob, numbness, weakness, visual changes (diplopia, blurred vision, loss of vision), abd pain (28 Jun 2021 11:31)      OVERNIGHT EVENTS:     Vital Signs Last 24 Hrs  T(C): 37.1 (29 Jun 2021 17:30), Max: 37.1 (29 Jun 2021 07:00)  T(F): 98.8 (29 Jun 2021 17:30), Max: 98.8 (29 Jun 2021 17:30)  HR: 61 (29 Jun 2021 20:00) (50 - 67)  BP: 119/58 (29 Jun 2021 06:28) (119/58 - 119/58)  BP(mean): --  RR: 16 (29 Jun 2021 20:00) (11 - 20)  SpO2: 100% (29 Jun 2021 20:00) (94% - 100%)    PHYSICAL EXAM:    General: No Acute Distress     Neurological: Awake, alert oriented to person, place and time, Following Commands, PERRL, EOMI, Face Symmetrical, Speech Fluent, Moving all extremities, Muscle Strength normal in all four extremities, No Drift, Sensation to Light Touch Intact    Pulmonary: Clear to Auscultation, No Rales, No Rhonchi, No Wheezes     Cardiovascular: S1, S2, Regular Rate and Rhythm     Gastrointestinal: Soft, Nontender, Nondistended     Incision:     LABS:                        13.1   11.07 )-----------( 199      ( 28 Jun 2021 21:10 )             38.2    06-29    135  |  100  |  14  ----------------------------<  156<H>  4.1   |  24  |  0.62    Ca    8.9      29 Jun 2021 03:10  Phos  3.1     06-29  Mg     2.1     06-29    TPro  7.5  /  Alb  4.3  /  TBili  0.3  /  DBili  x   /  AST  13  /  ALT  16  /  AlkPhos  78  06-28  PT/INR - ( 28 Jun 2021 09:54 )   PT: 12.5 sec;   INR: 1.04 ratio         PTT - ( 28 Jun 2021 09:54 )  PTT:23.9 sec      06-28 @ 07:01 - 06-29 @ 07:00  --------------------------------------------------------  IN: 1825 mL / OUT: 850 mL / NET: 975 mL    06-29 @ 07:01 - 06-29 @ 20:16  --------------------------------------------------------  IN: 555 mL / OUT: 1800 mL / NET: -1245 mL      DRAINS:     MEDICATIONS:  Antibiotics:    Neuro:  acetaminophen   Tablet .. 650 milliGRAM(s) Oral every 6 hours PRN Temp greater or equal to 38C (100.4F), Mild Pain (1 - 3)  HYDROmorphone  Injectable 0.25 milliGRAM(s) IV Push once  levETIRAcetam  IVPB 500 milliGRAM(s) IV Intermittent every 12 hours  traMADol 25 milliGRAM(s) Oral every 6 hours PRN Moderate Pain (4 - 6)  traMADol 50 milliGRAM(s) Oral every 6 hours PRN Severe Pain (7 - 10)    Cardiac:  niCARdipine Infusion 3 mG/Hr IV Continuous <Continuous>    Pulm:    GI/:  polyethylene glycol 3350 17 Gram(s) Oral two times a day  senna 2 Tablet(s) Oral at bedtime    Other:   chlorhexidine 4% Liquid 1 Application(s) Topical <User Schedule>  sodium chloride 0.9%. 1000 milliLiter(s) IV Continuous <Continuous>    DIET: [] Regular [] CCD [] Renal [] Puree [] Dysphagia [] Tube Feeds:     IMAGING:    SUBJECTIVE: HPI:  64 yo M with no known PMHx, presents to the ED c/o worsening b/l temporal and occipital HA over the past 3 days. Pt returned from the [place] 2 weeks ago. 1 week ago, noted "whooshing" sensation to his R ear. Saw an ENT, was given oral abx and otic abx drops but only took about 2 doses. He had this headache and it was mild however, 3 days ago, it has worsened. He then felt nausea and vomited a few times. Pt also noting dizziness, described as unsteadiness. The wife, who is at bedside, endorsed that pt appeared unsteady when ambulating. He is also endorsing neck pain/stiffness. He denies fevers, chills, diarrhea, cp, sob, numbness, weakness, visual changes (diplopia, blurred vision, loss of vision), abd pain (28 Jun 2021 11:31)      INTERVAL EVENTS: Patient went to angio: s/p b/l MMA embo, right M3 thrombectomy, w/ intergrillin & Milrinone given.     Vital Signs Last 24 Hrs  T(C): 37.1 (29 Jun 2021 17:30), Max: 37.1 (29 Jun 2021 07:00)  T(F): 98.8 (29 Jun 2021 17:30), Max: 98.8 (29 Jun 2021 17:30)  HR: 61 (29 Jun 2021 20:00) (50 - 67)  BP: 119/58 (29 Jun 2021 06:28) (119/58 - 119/58)  BP(mean): --  RR: 16 (29 Jun 2021 20:00) (11 - 20)  SpO2: 100% (29 Jun 2021 20:00) (94% - 100%)    PHYSICAL EXAM:    General: No Acute Distress     Neurological: Arousable, OX3, mild dysarthria, left facial, following commands, LUE 4/5 w/ HG 1/5, RUE 5/5, LLE 5/5, RLE 5/5    Pulmonary: Clear to Auscultation, No Rales, No Rhonchi, No Wheezes     Cardiovascular: S1, S2, Regular Rate and Rhythm     Gastrointestinal: Soft, Nontender, Nondistended     Incision: Groin puncture from angio - stable, no hematoma    LABS:                        13.1   11.07 )-----------( 199      ( 28 Jun 2021 21:10 )             38.2    06-29    135  |  100  |  14  ----------------------------<  156<H>  4.1   |  24  |  0.62    Ca    8.9      29 Jun 2021 03:10  Phos  3.1     06-29  Mg     2.1     06-29    TPro  7.5  /  Alb  4.3  /  TBili  0.3  /  DBili  x   /  AST  13  /  ALT  16  /  AlkPhos  78  06-28  PT/INR - ( 28 Jun 2021 09:54 )   PT: 12.5 sec;   INR: 1.04 ratio         PTT - ( 28 Jun 2021 09:54 )  PTT:23.9 sec      06-28 @ 07:01 - 06-29 @ 07:00  --------------------------------------------------------  IN: 1825 mL / OUT: 850 mL / NET: 975 mL    06-29 @ 07:01 - 06-29 @ 20:16  --------------------------------------------------------  IN: 555 mL / OUT: 1800 mL / NET: -1245 mL      DRAINS: None    MEDICATIONS:  Antibiotics:    Neuro:  acetaminophen   Tablet .. 650 milliGRAM(s) Oral every 6 hours PRN Temp greater or equal to 38C (100.4F), Mild Pain (1 - 3)  HYDROmorphone  Injectable 0.25 milliGRAM(s) IV Push once  levETIRAcetam  IVPB 500 milliGRAM(s) IV Intermittent every 12 hours  traMADol 25 milliGRAM(s) Oral every 6 hours PRN Moderate Pain (4 - 6)  traMADol 50 milliGRAM(s) Oral every 6 hours PRN Severe Pain (7 - 10)    Cardiac:  niCARdipine Infusion 3 mG/Hr IV Continuous <Continuous>    Pulm:    GI/:  polyethylene glycol 3350 17 Gram(s) Oral two times a day  senna 2 Tablet(s) Oral at bedtime    Other:   chlorhexidine 4% Liquid 1 Application(s) Topical <User Schedule>  sodium chloride 0.9%. 1000 milliLiter(s) IV Continuous <Continuous>    DIET: [x] Regular [] CCD [] Renal [] Puree [] Dysphagia [] Tube Feeds:     IMAGING:   < from: MR Head w/wo IV Cont (06.29.21 @ 12:40) >  IMPRESSION:      Chronic bilateral subdural hematomas with acute hemorrhage in the anterior interhemispheric fissure. Effacement of suprasellar cistern. This could be due to either increased intracranial pressure or intracranial hypotension, as the hypothalamus appears to be somewhat sagging. No abnormal parenchymal or leptomeningeal enhancement.    Normal intracranial MRA, no aneurysms.    No intraspinal hemorrhage or mass. Moderate spinal stenosis L5-S1.Lumbarized S1 vertebral body. No abnormal enhancement.    ERLIN DE LOS SANTOS MD; Attending Radiologist  This document has been electronically signed. Jun 29 2021  1:13PM    < end of copied text >

## 2021-06-30 LAB
ANION GAP SERPL CALC-SCNC: 9 MMOL/L — SIGNIFICANT CHANGE UP (ref 5–17)
BUN SERPL-MCNC: 15 MG/DL — SIGNIFICANT CHANGE UP (ref 7–23)
CALCIUM SERPL-MCNC: 8.7 MG/DL — SIGNIFICANT CHANGE UP (ref 8.4–10.5)
CHLORIDE SERPL-SCNC: 102 MMOL/L — SIGNIFICANT CHANGE UP (ref 96–108)
CO2 SERPL-SCNC: 25 MMOL/L — SIGNIFICANT CHANGE UP (ref 22–31)
CREAT SERPL-MCNC: 0.66 MG/DL — SIGNIFICANT CHANGE UP (ref 0.5–1.3)
GLUCOSE SERPL-MCNC: 149 MG/DL — HIGH (ref 70–99)
HCT VFR BLD CALC: 38.8 % — LOW (ref 39–50)
HGB BLD-MCNC: 12.9 G/DL — LOW (ref 13–17)
MAGNESIUM SERPL-MCNC: 2.1 MG/DL — SIGNIFICANT CHANGE UP (ref 1.6–2.6)
MCHC RBC-ENTMCNC: 30.8 PG — SIGNIFICANT CHANGE UP (ref 27–34)
MCHC RBC-ENTMCNC: 33.2 GM/DL — SIGNIFICANT CHANGE UP (ref 32–36)
MCV RBC AUTO: 92.6 FL — SIGNIFICANT CHANGE UP (ref 80–100)
NRBC # BLD: 0 /100 WBCS — SIGNIFICANT CHANGE UP (ref 0–0)
PHOSPHATE SERPL-MCNC: 2.9 MG/DL — SIGNIFICANT CHANGE UP (ref 2.5–4.5)
PLATELET # BLD AUTO: 175 K/UL — SIGNIFICANT CHANGE UP (ref 150–400)
POTASSIUM SERPL-MCNC: 4 MMOL/L — SIGNIFICANT CHANGE UP (ref 3.5–5.3)
POTASSIUM SERPL-SCNC: 4 MMOL/L — SIGNIFICANT CHANGE UP (ref 3.5–5.3)
RBC # BLD: 4.19 M/UL — LOW (ref 4.2–5.8)
RBC # FLD: 12.5 % — SIGNIFICANT CHANGE UP (ref 10.3–14.5)
SODIUM SERPL-SCNC: 136 MMOL/L — SIGNIFICANT CHANGE UP (ref 135–145)
WBC # BLD: 12.65 K/UL — HIGH (ref 3.8–10.5)
WBC # FLD AUTO: 12.65 K/UL — HIGH (ref 3.8–10.5)

## 2021-06-30 PROCEDURE — 99291 CRITICAL CARE FIRST HOUR: CPT

## 2021-06-30 PROCEDURE — 70450 CT HEAD/BRAIN W/O DYE: CPT | Mod: 26

## 2021-06-30 PROCEDURE — 93970 EXTREMITY STUDY: CPT | Mod: 26

## 2021-06-30 RX ORDER — ACETAMINOPHEN 500 MG
1000 TABLET ORAL ONCE
Refills: 0 | Status: DISCONTINUED | OUTPATIENT
Start: 2021-06-30 | End: 2021-06-30

## 2021-06-30 RX ORDER — ONDANSETRON 8 MG/1
4 TABLET, FILM COATED ORAL EVERY 6 HOURS
Refills: 0 | Status: DISCONTINUED | OUTPATIENT
Start: 2021-06-30 | End: 2021-07-04

## 2021-06-30 RX ADMIN — LEVETIRACETAM 400 MILLIGRAM(S): 250 TABLET, FILM COATED ORAL at 05:35

## 2021-06-30 RX ADMIN — Medication 4 MILLIGRAM(S): at 17:02

## 2021-06-30 RX ADMIN — Medication 4 MILLIGRAM(S): at 11:47

## 2021-06-30 RX ADMIN — Medication 100 MILLIEQUIVALENT(S): at 02:03

## 2021-06-30 RX ADMIN — Medication 4 MILLIGRAM(S): at 05:35

## 2021-06-30 RX ADMIN — LEVETIRACETAM 400 MILLIGRAM(S): 250 TABLET, FILM COATED ORAL at 17:03

## 2021-06-30 RX ADMIN — Medication 1000 MILLIGRAM(S): at 05:50

## 2021-06-30 RX ADMIN — Medication 100 MILLIEQUIVALENT(S): at 00:38

## 2021-06-30 RX ADMIN — Medication 400 MILLIGRAM(S): at 05:35

## 2021-06-30 RX ADMIN — SODIUM CHLORIDE 75 MILLILITER(S): 9 INJECTION INTRAMUSCULAR; INTRAVENOUS; SUBCUTANEOUS at 09:46

## 2021-06-30 RX ADMIN — CHLORHEXIDINE GLUCONATE 1 APPLICATION(S): 213 SOLUTION TOPICAL at 22:24

## 2021-06-30 NOTE — SWALLOW BEDSIDE ASSESSMENT ADULT - ADDITIONAL RECOMMENDATIONS
B/L mixed density lateral convexity subdural collections measuring up to 7 mm in greatest depth on the left and 6 mm on the right. Acute SDH in anterior interhemispheric fissure measuring 5 mm in greatest depth. Rightward midline shift of 4 mm. Partial effacement of the basal cisterns. No hydrocephalus. No parenchymal hemorrhage or brain edema.  6/29: s/p b/l MMA embo, right M3 thrombectomy, This service will continue to follow. Will reassess pending continued improvement of neuro status.

## 2021-06-30 NOTE — SWALLOW BEDSIDE ASSESSMENT ADULT - COMMENTS
CT brain: B/L mixed density lateral convexity subdural collections measuring up to 7 mm in greatest depth on the left and 6 mm on the right. Acute SDH in anterior interhemispheric fissure measuring 5 mm in greatest depth. Rightward midline shift of 4 mm. Partial effacement of the basal cisterns. No hydrocephalus. No parenchymal hemorrhage or brain edema.  CTA brain: No flow-limiting stenosis. No AVM. 1 mm superiorly projecting proximal right A2 segment aneurysm. The neck measures 1 mm in size.  CTA neck: No flow-limiting stenosis. No evidence for arterial dissection.    6/29: s/p b/l MMA embo, right M3 thrombectomy, w/ intergrillin & Milrinone given.   Per NSx: Neuro exam 6/30: HA is manageble, AAOx3, some dysarthria, L facial, LUE is 3/5 prox but HG was a 1 for me, BLE 4+-5/5

## 2021-06-30 NOTE — PROGRESS NOTE ADULT - SUBJECTIVE AND OBJECTIVE BOX
Patient seen and examined at bedside.    --Anticoagulation--    T(C): 37.1 (06-29-21 @ 17:30), Max: 37.1 (06-29-21 @ 07:00)  HR: 53 (06-29-21 @ 23:00) (50 - 74)  BP: 119/58 (06-29-21 @ 06:28) (119/58 - 119/58)  RR: 14 (06-29-21 @ 23:00) (11 - 20)  SpO2: 97% (06-29-21 @ 23:00) (96% - 100%)  Wt(kg): --    Exam:  AAOx3, some dysarthria, L facial, LUE is 3/5 prox but HG was a 1 for me, BLE 4+-5/5

## 2021-06-30 NOTE — SWALLOW BEDSIDE ASSESSMENT ADULT - SLP PERTINENT HISTORY OF CURRENT PROBLEM
66 yo M with no known PMHx, presents to the ED c/o worsening b/l temporal and occipital HA over the past 3 days. Pt returned from the [place] 2 weeks ago. 1 week ago, noted "whooshing" sensation to his R ear. Saw an ENT, was given oral abx and otic abx drops but only took about 2 doses. He had this headache and it was mild however, 3 days ago, it has worsened. He then felt nausea and vomited a few times. Pt also noting dizziness, described as unsteadiness. The wife, who is at bedside, endorsed that pt appeared unsteady when ambulating. He is also endorsing neck pain/stiffness. He denies fevers, chills, diarrhea, cp, sob, numbness, weakness, visual changes (diplopia, blurred vision, loss of vision), abd pain (28 Jun 2021 11:31)

## 2021-06-30 NOTE — SPEECH LANGUAGE PATHOLOGY EVALUATION - SLP BEHAVIORAL OBSERVATIONS
initially alert, became more fatigued as assessment continued; cooperative and generally appropriate

## 2021-06-30 NOTE — SWALLOW BEDSIDE ASSESSMENT ADULT - PHARYNGEAL PHASE
suspect delayed pharyngeal swallow trigger; delayed cough or throat clear after sequential trials of purees and liquids; immediate throat clear post mechanical soft

## 2021-06-30 NOTE — SPEECH LANGUAGE PATHOLOGY EVALUATION - COMMENTS
CT brain: B/L mixed density lateral convexity subdural collections measuring up to 7 mm in greatest depth on the left and 6 mm on the right. Acute SDH in anterior interhemispheric fissure measuring 5 mm in greatest depth. Rightward midline shift of 4 mm. Partial effacement of the basal cisterns. No hydrocephalus. No parenchymal hemorrhage or brain edema.  CTA brain: No flow-limiting stenosis. No AVM. 1 mm superiorly projecting proximal right A2 segment aneurysm. The neck measures 1 mm in size.  CTA neck: No flow-limiting stenosis. No evidence for arterial dissection.    6/29: s/p b/l MMA embo, right M3 thrombectomy, w/ intergrillin & Milrinone given.   Per NSx: Neuro exam 6/30: HA is manageble, AAOx3, some dysarthria, L facial, LUE is 3/5 prox but HG was a 1 for me, BLE 4+-5/5 Suspect interference of fatigue. Not fully assessed at this time. Will f/u for further assessment. did not assess at this time due to Pt fatigue This service will continue to follow.

## 2021-06-30 NOTE — PROGRESS NOTE ADULT - SUBJECTIVE AND OBJECTIVE BOX
EVENTS:   s/p MMA embolization   Underwent right mma embo with coils.  Underwent rmca thrombectomy and intra arterial administration of Integrilin, nicardipine, and milrinone      T(C): 36.8 (06-30-21 @ 11:00), Max: 37.3 (06-30-21 @ 03:00)  HR: 58 (06-30-21 @ 12:00) (44 - 74)  BP: 130/68 (06-30-21 @ 07:00) (113/80 - 135/74)  RR: 17 (06-30-21 @ 12:00) (10 - 20)  SpO2: 94% (06-30-21 @ 12:00) (92% - 100%)  06-29-21 @ 07:01  -  06-30-21 @ 07:00  --------------------------------------------------------  IN: 1915 mL / OUT: 2875 mL / NET: -960 mL    06-30-21 @ 07:01  -  06-30-21 @ 12:57  --------------------------------------------------------  IN: 375 mL / OUT: 750 mL / NET: -375 mL    acetaminophen   Tablet .. 650 milliGRAM(s) Oral every 6 hours PRN  atorvastatin 40 milliGRAM(s) Oral at bedtime  chlorhexidine 4% Liquid 1 Application(s) Topical <User Schedule>  dexAMETHasone  Injectable 4 milliGRAM(s) IV Push every 6 hours  HYDROmorphone  Injectable 0.25 milliGRAM(s) IV Push every 4 hours PRN  levETIRAcetam  IVPB 500 milliGRAM(s) IV Intermittent every 12 hours  ondansetron Injectable 4 milliGRAM(s) IV Push every 6 hours PRN  polyethylene glycol 3350 17 Gram(s) Oral two times a day  senna 2 Tablet(s) Oral at bedtime  sodium chloride 0.9%. 1000 milliLiter(s) IV Continuous <Continuous>  traMADol 25 milliGRAM(s) Oral every 6 hours PRN      PHYSICAL EXAM:    General: No Acute Distress     Neurological: Drowsy, alert oriented to person, place and time, Following Commands, PERRL, EOMI,  dysarthria, left facial , left biceps 2/5, triceps 4/5, hand  0 , left LE hip flexion 4+, otherwise 5/5, right side 5/5     Pulmonary: Clear to Auscultation, No Rales, No Rhonchi, No Wheezes     Cardiovascular: S1, S2, Regular Rate and Rhythm     Gastrointestinal: Soft, Nontender, Nondistended     Extremities: No calf tenderness , no groin hematoma                 LABS:  Na: 138 (06-29 @ 20:56), 135 (06-29 @ 03:10), 134 (06-28 @ 21:10), 138 (06-28 @ 07:07)  K: 3.7 (06-29 @ 20:56), 4.1 (06-29 @ 03:10), 3.8 (06-28 @ 21:10), 3.8 (06-28 @ 07:07)  Cl: 103 (06-29 @ 20:56), 100 (06-29 @ 03:10), 101 (06-28 @ 21:10), 101 (06-28 @ 07:07)  CO2: 24 (06-29 @ 20:56), 24 (06-29 @ 03:10), 23 (06-28 @ 21:10), 22 (06-28 @ 07:07)  BUN: 12 (06-29 @ 20:56), 14 (06-29 @ 03:10), 13 (06-28 @ 21:10), 18 (06-28 @ 07:07)  Cr: 0.66 (06-29 @ 20:56), 0.62 (06-29 @ 03:10), 0.59 (06-28 @ 21:10), 0.81 (06-28 @ 07:07)  Glu: 141(06-29 @ 20:56), 156(06-29 @ 03:10), 134(06-28 @ 21:10), 146(06-28 @ 07:07)    Hgb: 13.1 (06-29 @ 20:56), 13.1 (06-28 @ 21:10), 14.7 (06-28 @ 07:07)  Hct: 39.0 (06-29 @ 20:56), 38.2 (06-28 @ 21:10), 44.7 (06-28 @ 07:07)  WBC: 15.01 (06-29 @ 20:56), 11.07 (06-28 @ 21:10), 12.43 (06-28 @ 07:07)  Plt: 190 (06-29 @ 20:56), 199 (06-28 @ 21:10), 251 (06-28 @ 07:07)    INR: 1.04 06-28-21 @ 09:54  PTT: 23.9 06-28-21 @ 09:54                                 Assessment and Plan:   · Assessment	  A/P:  66 yo M  bilateral subdural hematoma chronic with some acute component with midline shift and brain compression   CTA showed right A 2 aneurysm   Underwent right mma embo with coils.  Underwent rmca thrombectomy and intra arterial administration of Integrilin, nicardipine, and milrinone POD 1     Neuro: neuro checks q 2  hr   CT head today stable   MRI brain concern for brain sag    keppra 500 mg BID for seizure prophylaxis   tylenol and tramadol  decadron for 1 day for brain edema   PT/OT/OBC    Respiratory: RA   CV:  sinus bradycardia   NSR TTE pending   -160 mmhg  Endocrine: target euglycemia   Heme/Onc: INR <1.5, Plt>100            DVT ppx: SCD, hold chemoprophylaxis till tomorrow ? acute component   Doppler B/L LE  pending read   Renal: NS 75 ml/hr while NPO   ID: afebrile  GI: failed swallow eval   Social/Family: updated at bedside  Discharge planning: ICU    Code Status: [x] Full Code [] DNR [] DNI [] Goals of Care:   Disposition: [x] ICU [] Stroke Unit [] RCU []PCU []Floor [] Discharge Home     Patient at high risk for neurologic deterioration, aneurysm rerupture, seizures  critical care time, excluding procedures: 30  minutes

## 2021-06-30 NOTE — SPEECH LANGUAGE PATHOLOGY EVALUATION - SLP DIAGNOSIS
Orders received for speech and language evaluation, chart reviewed to date. Per conversation with NSCU team, patient is going for all-day MRI and angio, and not appropriate to be seen today. Request for patient to be seen tomorrow 6/30 for initial speech language evaluation.
64 yo male admitted for headache x 3 days, found to have B/L subdural collections, Acute SDH in anterior interhemispheric fissure with midline shift, s/p b/l MMA embo, right M3 thrombectomy. Pt also presents with a mild dysarthria and cognitive-linguistic deficits. Motor speech is notable for reduced vocal intensity with hypophonia, mild articulatory imprecision, questionable slight hypernasality, and suspected component of reduced effort due to fatigue. Cognitive linguistic skills notable for impairments in attention and concentration, with occasional need for repetitions, and somewhat concrete thinking. Suspect interference of component of fatigue. Will f/u for further assessment, as Pt noted to have difficulty maintaining alertness as assessment continued.

## 2021-06-30 NOTE — SWALLOW BEDSIDE ASSESSMENT ADULT - SWALLOW EVAL: DIAGNOSIS
66 yo male admitted for headache x 3 days, found to have 64 yo male admitted for headache x 3 days, found to have B/L subdural collections, Acute SDH in anterior interhemispheric fissure with midline shift, s/p b/l MMA embo, right M3 thrombectomy. Pt presents with evidence of an oropharyngeal dysphagia notable for mildly reduced L labial seal, mildly prolonged oral phase, suspected delay in pharyngeal swallow trigger, and s/s laryngeal penetration/aspiration with most conservative textures. Pt also presents with a mild dysarthria and cognitive-linguistic deficits. See speech-language evaluation for full details.

## 2021-06-30 NOTE — SPEECH LANGUAGE PATHOLOGY EVALUATION - SLP PERTINENT HISTORY OF CURRENT PROBLEM
64 yo M with no known PMHx, presents to the ED c/o worsening b/l temporal and occipital HA over the past 3 days. Pt returned from the [place] 2 weeks ago. 1 week ago, noted "whooshing" sensation to his R ear. Saw an ENT, was given oral abx and otic abx drops but only took about 2 doses. He had this headache and it was mild however, 3 days ago, it has worsened. He then felt nausea and vomited a few times. Pt also noting dizziness, described as unsteadiness. The wife, who is at bedside, endorsed that pt appeared unsteady when ambulating. He is also endorsing neck pain/stiffness. He denies fevers, chills, diarrhea, cp, sob, numbness, weakness, visual changes (diplopia, blurred vision, loss of vision), abd pain (28 Jun 2021 11:31)
64 yo M with no known PMHx, presents to the ED c/o worsening b/l temporal and occipital HA over the past 3 days. Pt returned from the [place] 2 weeks ago. 1 week ago, noted "whooshing" sensation to his R ear. Saw an ENT, was given oral abx and otic abx drops but only took about 2 doses. He had this headache and it was mild however, 3 days ago, it has worsened. He then felt nausea and vomited a few times. Pt also noting dizziness, described as unsteadiness. The wife, who is at bedside, endorsed that pt appeared unsteady when ambulating. He is also endorsing neck pain/stiffness. He denies fevers, chills, diarrhea, cp, sob, numbness, weakness, visual changes (diplopia, blurred vision, loss of vision), abd pain (28 Jun 2021 11:31)

## 2021-06-30 NOTE — SWALLOW BEDSIDE ASSESSMENT ADULT - SLP GENERAL OBSERVATIONS
Pt seen at bedside, awake and alert, oriented, verbally responsive, following directions for the purposes of the exam.

## 2021-06-30 NOTE — PROGRESS NOTE ADULT - ASSESSMENT
Patient seen and examined at bedside s/p Angio w/ B/L MMAE.  Exam: HA is manageble, AAOx3, some dysarthria, L facial, LUE is 3/5 prox but HG was a 1 for me, BLE 4+-5/5  -CTH AM

## 2021-06-30 NOTE — SWALLOW BEDSIDE ASSESSMENT ADULT - ORAL PREPARATORY PHASE
"Chief Complaint   Patient presents with     WOUND CARE     [facility]       Initial Pulse 72  Resp 14  Wt 193 lb 6.4 oz (87.7 kg)  BMI 24.83 kg/m2 Estimated body mass index is 24.83 kg/(m^2) as calculated from the following:    Height as of 9/1/17: 6' 2\" (1.88 m).    Weight as of this encounter: 193 lb 6.4 oz (87.7 kg).  Medication Reconciliation: complete    "
BARTOLO Rondon-Debbi applied to lower leg wound, sterile dressing applied......................................Cata Hagan CMA  ([facility])  
mildly reduced spoon stripping on L

## 2021-06-30 NOTE — SPEECH LANGUAGE PATHOLOGY EVALUATION - SPECIFY REASON(S)
to subjectively assess language, speech, voice and cognitive function
to subjectively assess language, speech, voice and cognitive function

## 2021-06-30 NOTE — SPEECH LANGUAGE PATHOLOGY EVALUATION - SLP PATIENT/FAMILY GOALS STATEMENT
Pt and family report that Pt has no h/o of communication deficits. Pt states, "This is not who I am." He reported frustration with his new onset of deficits, and expressed motivation to get back to his baseline.

## 2021-06-30 NOTE — SPEECH LANGUAGE PATHOLOGY EVALUATION - SLP PATIENT PROFILE REVIEW
yes Pt is bilingual in English and Hebrew. Pt and family stated preference to conduct assessment in English./yes

## 2021-07-01 LAB
T3FREE SERPL-MCNC: 1.56 PG/ML — LOW (ref 1.8–4.6)
T4 FREE SERPL-MCNC: 1.1 NG/DL — SIGNIFICANT CHANGE UP (ref 0.9–1.8)

## 2021-07-01 PROCEDURE — 99232 SBSQ HOSP IP/OBS MODERATE 35: CPT

## 2021-07-01 RX ORDER — LANOLIN ALCOHOL/MO/W.PET/CERES
5 CREAM (GRAM) TOPICAL ONCE
Refills: 0 | Status: COMPLETED | OUTPATIENT
Start: 2021-07-01 | End: 2021-07-01

## 2021-07-01 RX ORDER — ENOXAPARIN SODIUM 100 MG/ML
40 INJECTION SUBCUTANEOUS
Refills: 0 | Status: DISCONTINUED | OUTPATIENT
Start: 2021-07-01 | End: 2021-07-04

## 2021-07-01 RX ORDER — ACETAMINOPHEN 500 MG
1000 TABLET ORAL ONCE
Refills: 0 | Status: COMPLETED | OUTPATIENT
Start: 2021-07-01 | End: 2021-07-01

## 2021-07-01 RX ORDER — LEVETIRACETAM 250 MG/1
500 TABLET, FILM COATED ORAL
Refills: 0 | Status: DISCONTINUED | OUTPATIENT
Start: 2021-07-01 | End: 2021-07-02

## 2021-07-01 RX ADMIN — Medication 650 MILLIGRAM(S): at 23:45

## 2021-07-01 RX ADMIN — Medication 650 MILLIGRAM(S): at 23:00

## 2021-07-01 RX ADMIN — Medication 400 MILLIGRAM(S): at 06:55

## 2021-07-01 RX ADMIN — ATORVASTATIN CALCIUM 40 MILLIGRAM(S): 80 TABLET, FILM COATED ORAL at 21:24

## 2021-07-01 RX ADMIN — Medication 5 MILLIGRAM(S): at 21:24

## 2021-07-01 RX ADMIN — LEVETIRACETAM 500 MILLIGRAM(S): 250 TABLET, FILM COATED ORAL at 17:27

## 2021-07-01 RX ADMIN — SODIUM CHLORIDE 75 MILLILITER(S): 9 INJECTION INTRAMUSCULAR; INTRAVENOUS; SUBCUTANEOUS at 12:48

## 2021-07-01 RX ADMIN — CHLORHEXIDINE GLUCONATE 1 APPLICATION(S): 213 SOLUTION TOPICAL at 21:24

## 2021-07-01 RX ADMIN — ENOXAPARIN SODIUM 40 MILLIGRAM(S): 100 INJECTION SUBCUTANEOUS at 17:26

## 2021-07-01 RX ADMIN — LEVETIRACETAM 400 MILLIGRAM(S): 250 TABLET, FILM COATED ORAL at 05:21
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
## 2021-07-01 NOTE — PROGRESS NOTE ADULT - SUBJECTIVE AND OBJECTIVE BOX
Patient seen and examined at bedside.    --Anticoagulation--    T(C): 37.3 (06-30-21 @ 19:00), Max: 37.3 (06-30-21 @ 03:00)  HR: 56 (06-30-21 @ 23:00) (45 - 65)  BP: 130/68 (06-30-21 @ 07:00) (113/80 - 135/74)  RR: 15 (06-30-21 @ 23:00) (10 - 19)  SpO2: 93% (06-30-21 @ 23:00) (93% - 100%)  Wt(kg): --          Exam:  AAOx3, some dysarthria, L facial, LUE is 3/5 prox but HG was a 1 for me, BLE 4+-5/5

## 2021-07-01 NOTE — DIETITIAN INITIAL EVALUATION ADULT. - ADD RECOMMEND
1. Defer initiation of PO diet vs EN feeds to medical team. 2. If PO diet initiated, consider no therapeutic restrictions; if persistent elevated FSBG noted, consider consistent carbohydrate. Defer consistency to medical team, SLP. 3. Monitor wt trends, nutrition related labs, skin integrity, hydration status and bowel regularity. 4. Consider multivitamin in context of NPO status; aid in prevention of micronutrient deficiencies.

## 2021-07-01 NOTE — PROGRESS NOTE ADULT - SUBJECTIVE AND OBJECTIVE BOX
EVENTS:   s/p MMA embolization   Underwent right mma embo with coils.  Underwent rmca thrombectomy and intra arterial administration of Integrilin, nicardipine, and milrinone\      ICU Vital Signs Last 24 Hrs  T(C): 37.3 (30 Jun 2021 19:00), Max: 37.3 (30 Jun 2021 19:00)  T(F): 99.1 (30 Jun 2021 19:00), Max: 99.1 (30 Jun 2021 19:00)  HR: 50 (01 Jul 2021 05:00) (45 - 65)  BP: 130/68 (30 Jun 2021 07:00) (130/68 - 130/68)  BP(mean): 85 (30 Jun 2021 07:00) (85 - 85)  ABP: 140/61 (01 Jul 2021 05:00) (102/44 - 160/77)  ABP(mean): 90 (01 Jul 2021 05:00) (65 - 128)  RR: 12 (01 Jul 2021 05:00) (10 - 19)  SpO2: 96% (01 Jul 2021 05:00) (93% - 100%)      06-29-21 @ 07:01  -  06-30-21 @ 07:00  --------------------------------------------------------  IN: 1915 mL / OUT: 2875 mL / NET: -960 mL    06-30-21 @ 07:01 - 07-01-21 @ 05:50  --------------------------------------------------------  IN: 1825 mL / OUT: 1250 mL / NET: 575 mL      acetaminophen   Tablet .. 650 milliGRAM(s) Oral every 6 hours PRN  acetaminophen 325 mG/butalbital 50 mG/caffeine 40 mG 2 Tablet(s) Oral every 6 hours PRN  atorvastatin 40 milliGRAM(s) Oral at bedtime  chlorhexidine 4% Liquid 1 Application(s) Topical <User Schedule>  levETIRAcetam  IVPB 500 milliGRAM(s) IV Intermittent every 12 hours  ondansetron Injectable 4 milliGRAM(s) IV Push every 6 hours PRN  polyethylene glycol 3350 17 Gram(s) Oral two times a day  senna 2 Tablet(s) Oral at bedtime  sodium chloride 0.9%. 1000 milliLiter(s) (75 mL/Hr) IV Continuous <Continuous>  traMADol 25 milliGRAM(s) Oral every 6 hours PRN                          12.9   12.65 )-----------( 175      ( 30 Jun 2021 21:23 )             38.8     06-30    136  |  102  |  15  ----------------------------<  149<H>  4.0   |  25  |  0.  Ca    8.7      30 Jun 2021 21:23  Phos  2.9     06-30  Mg     2.1     06-30      PHYSICAL EXAM:  General: No Acute Distress   Neurological: Drowsy, alert oriented to person, place and time, Following Commands, PERRL, EOMI,  dysarthria, left facial , left biceps 2/5, triceps 4/5, hand  0 , left LE hip flexion 4+, otherwise 5/5, right side 5/5   Pulmonary: Clear to Auscultation, No Rales, No Rhonchi, No Wheezes   Cardiovascular: S1, S2, Regular Rate and Rhythm   Gastrointestinal: Soft, Nontender, Nondistended   Extremities: No calf tenderness , no groin hematoma                             History of Present Illness:  History of Present Illness:   64 yo M with no known PMHx, presents to the ED c/o worsening b/l temporal and occipital HA over the past 3 days. Pt returned from the [place] 2 weeks ago. 1 week ago, noted "whooshing" sensation to his R ear. Saw an ENT, was given oral abx and otic abx drops but only took about 2 doses. He had this headache and it was mild however, 3 days ago, it has worsened. He then felt nausea and vomited a few times. Pt also noting dizziness, described as unsteadiness. The wife, who is at bedside, endorsed that pt appeared unsteady when ambulating. He is also endorsing neck pain/stiffness. He denies fevers, chills, diarrhea, cp, sob, numbness, weakness, visual changes (diplopia, blurred vision, loss of vision), abd pain     Review of Systems:  Other Review of Systems: All other review of systems negative, except as noted in HPI      Allergies and Intolerances:        Allergies:  	No Known Allergies:     Home Medications:   * Outpatient Medication Status not yet specified  .    Patient History:    Tobacco Screening:  · Core Measure Site	No    Risk Assessment:    Present on Admission:  Deep Venous Thrombosis	no  Pulmonary Embolus	no     Heart Failure:  Does this patient have a history of or has been diagnosed with heart failure? unknown.    HIV Screen (per [facility] Department of Health, HIV screening must be offered to every individual between ages 13 and 64)	Not applicable (known HIV positive status)          EVENTS:   s/p MMA embolization   Underwent right mma embo with coils.  Underwent rmca thrombectomy and intra arterial administration of Integrilin, nicardipine, and milrinone    overnight event:  neuro exam improving      T(C): 36.8 (07-01-21 @ 07:00), Max: 37.3 (06-30-21 @ 19:00)  HR: 60 (07-01-21 @ 09:00) (41 - 65)  BP: 141/79 (07-01-21 @ 07:00) (141/79 - 141/79)  RR: 16 (07-01-21 @ 09:00) (10 - 19)  SpO2: 97% (07-01-21 @ 09:00) (93% - 99%)  06-30-21 @ 07:01  -  07-01-21 @ 07:00  --------------------------------------------------------  IN: 1900 mL / OUT: 1810 mL / NET: 90 mL    07-01-21 @ 07:01  -  07-01-21 @ 10:32  --------------------------------------------------------  IN: 225 mL / OUT: 0 mL / NET: 225 mL    acetaminophen   Tablet .. 650 milliGRAM(s) Oral every 6 hours PRN  acetaminophen 325 mG/butalbital 50 mG/caffeine 40 mG 2 Tablet(s) Oral every 6 hours PRN  atorvastatin 40 milliGRAM(s) Oral at bedtime  chlorhexidine 4% Liquid 1 Application(s) Topical <User Schedule>  levETIRAcetam  IVPB 500 milliGRAM(s) IV Intermittent every 12 hours  ondansetron Injectable 4 milliGRAM(s) IV Push every 6 hours PRN  polyethylene glycol 3350 17 Gram(s) Oral two times a day  senna 2 Tablet(s) Oral at bedtime  sodium chloride 0.9%. 1000 milliLiter(s) IV Continuous <Continuous>  traMADol 25 milliGRAM(s) Oral every 6 hours PRN        PHYSICAL EXAM:  General: No Acute Distress   Neurological: Drowsy, alert oriented to person, place and time, Following Commands, PERRL, EOMI,  dysarthria, left facial , left biceps 4/5, triceps 4/5, hand  4 , left leg 5/5, right side 5/5   Pulmonary: Clear to Auscultation, No Rales, No Rhonchi, No Wheezes   Cardiovascular: S1, S2, Regular Rate and Rhythm   Gastrointestinal: Soft, Nontender, Nondistended   Extremities: No calf tenderness , no groin hematoma           LABS:  Na: 136 (06-30 @ 21:23), 138 (06-29 @ 20:56), 135 (06-29 @ 03:10), 134 (06-28 @ 21:10)  K: 4.0 (06-30 @ 21:23), 3.7 (06-29 @ 20:56), 4.1 (06-29 @ 03:10), 3.8 (06-28 @ 21:10)  Cl: 102 (06-30 @ 21:23), 103 (06-29 @ 20:56), 100 (06-29 @ 03:10), 101 (06-28 @ 21:10)  CO2: 25 (06-30 @ 21:23), 24 (06-29 @ 20:56), 24 (06-29 @ 03:10), 23 (06-28 @ 21:10)  BUN: 15 (06-30 @ 21:23), 12 (06-29 @ 20:56), 14 (06-29 @ 03:10), 13 (06-28 @ 21:10)  Cr: 0.66 (06-30 @ 21:23), 0.66 (06-29 @ 20:56), 0.62 (06-29 @ 03:10), 0.59 (06-28 @ 21:10)  Glu: 149(06-30 @ 21:23), 141(06-29 @ 20:56), 156(06-29 @ 03:10), 134(06-28 @ 21:10)    Hgb: 12.9 (06-30 @ 21:23), 13.1 (06-29 @ 20:56), 13.1 (06-28 @ 21:10)  Hct: 38.8 (06-30 @ 21:23), 39.0 (06-29 @ 20:56), 38.2 (06-28 @ 21:10)  WBC: 12.65 (06-30 @ 21:23), 15.01 (06-29 @ 20:56), 11.07 (06-28 @ 21:10)  Plt: 175 (06-30 @ 21:23), 190 (06-29 @ 20:56), 199 (06-28 @ 21:10)    INR:   PTT:

## 2021-07-01 NOTE — PROGRESS NOTE ADULT - ASSESSMENT
Patient seen and examined at bedside s/p Angio w/ B/L MMAE.  Exam: HA is manageble, AAOx3, some dysarthria, L facial, LUE is 3/5 prox but HG was a 1 for me, BLE 4+-5/5  -CTH AM WNL  -DVT ppx

## 2021-07-01 NOTE — PROGRESS NOTE ADULT - ASSESSMENT
A/P:  64 y/o M  bilateral subdural hematoma chronic with some acute component with midline shift and brain compression   CTA showed right A2 aneurysm   Underwent right mma embo with coils.  Underwent rmca thrombectomy and intra arterial administration of Integrilin, nicardipine, and milrinone POD 1     Neuro: neuro checks q 2  hr  CT head 6/30 stable   MRI brain concern for brain sag   Keppra 500 mg BID for seizure prophylaxis   tylenol and tramadol  Decadron for 1 day for brain edema/HAs  PT/OT/OBC      Respiratory: RA     CV:  sinus bradycardia   NSR TTE   -160 mmhg    Endocrine: target euglycemia     Heme/Onc: INR <1.5, Plt>100               DVT ppx: SCD, hold chemoprophylaxis till 7/1 ?acute component   Doppler B/L LE      Renal: NS 75 ml/hr while NPO     ID: Afebrile    GI: Failed swallow eval previously   Repeat swallow eval 7/1    Social/Family: updated at bedside    Discharge planning: ICU    Code Status: [x] Full Code [] DNR [] DNI [] Goals of Care:     Disposition: [x] ICU [] Stroke Unit [] RCU []PCU []Floor [] Discharge Home     Patient at high risk for neurologic deterioration, aneurysm rerupture, seizures  critical care time, excluding procedures: 30  minutes   A/P:  66 y/o M  bilateral subdural hematoma chronic with some acute component with midline shift and brain compression   CTA showed right A2 aneurysm   Underwent right mma embo with coils.  Underwent rmca thrombectomy and intra arterial administration of Integrilin, nicardipine, and milrinone POD 2    Neuro: neuro checks q 4  hr  MRI brain concern for brain sag   Keppra 500 mg BID for seizure prophylaxis   tylenol   PT/OT/OBC      Respiratory: on NC 2 L      CV:  sinus bradycardia , TSH nl   NSR TTE   -160 mmhg    Endocrine: target euglycemia     Heme/Onc: INR <1.5, Plt>100               DVT ppx: SCD, hold chemoprophylaxis till 7/1 ?acute component   Doppler B/L LE      Renal: NS 75 ml/hr while NPO     ID: Afebrile    GI: Failed swallow eval previously   Repeat swallow eval 7/1    Social/Family: updated at bedside    Discharge planning: ICU    Code Status: [x] Full Code [] DNR [] DNI [] Goals of Care:     Disposition: [x] ICU [] Stroke Unit [] RCU []PCU []Floor [] Discharge Home     Patient at high risk for neurologic deterioration, aneurysm rerupture, seizures  critical care time, excluding procedures: 30  minutes   A/P:  64 y/o M  bilateral subdural hematoma chronic with some acute component with midline shift and brain compression   CTA showed right A2 aneurysm   Underwent right mma embo with coils.  Underwent rmca thrombectomy and intra arterial administration of Integrilin, nicardipine, and milrinone POD 2    Neuro: neuro checks q 4  hr  MRI brain concern for brain sag   Keppra 500 mg BID for seizure prophylaxis   tylenol   PT/OT/OBC      Respiratory: on NC 2 L      CV:  sinus bradycardia   NSR TTE   -160 mmhg    Endocrine: target euglycemia   TSH low, will send  free T4 , free T3   endo consult     Heme/Onc: INR <1.5, Plt>100               DVT ppx: SCD, lovenox   Doppler B/L LE  no DVT     Renal: NS 75 ml/hr while NPO   while NPO     ID: Afebrile    GI: Failed swallow eval previously ,will Repeat swallow eval , if fails, NG   senna and miralax      Social/Family: updated at bedside    Discharge planning: ICU    Code Status: [x] Full Code [] DNR [] DNI [] Goals of Care:     Disposition: [] ICU [] Stroke Unit [] RCU []PCU [x]Floor [] Discharge Home       moderate complex medical decision

## 2021-07-01 NOTE — CHART NOTE - NSCHARTNOTEFT_GEN_A_CORE
66 yo male admitted for headache x 3 days, found to have B/L subdural collections, Acute SDH in anterior interhemispheric fissure with midline shift, s/p b/l MMA embo, right M3 thrombectomy.  Pt seen for initial bedside swallow evaluation on 6/30. Found to have evidence of an oropharyngeal dysphagia and dysarthria. Recommendations at that time included NPO, with non-oral nutrition/hydration/medications.     Pt seen today for bedside swallow re-evaluation.     Pt found in bed, awake and alert. On nasal cannula. Daughter at bedside. +Asymmetry of face, (Left side weakness). Mild dysarthria noted. Following commands for evaluation purposes and communicating wants and needs. Baseline throat clearing noted. PO trials administered-> thin puree, thick puree and honey thick liquid-> adequate labial and oral grading to utensil. Delayed oral transit time on purees evident. Noted delays in the trigger of the pharyngeal swallow when increased amount of liquid consumed. No changes in vocal quality, VSS. Nectar thick liquid and thin liquid-> suspect premature spillover, +delays in the trigger of the pharyngeal swallows. Delayed cough post swallow on thin liquid. Baseline throat clearing does not worsen with PO feeding.     Impressions: 66 yo male admitted for headache x 3 days, found to have B/L subdural collections, Acute SDH in anterior interhemispheric fissure with midline shift, s/p b/l MMA embo, right M3 thrombectomy. Pt presents with evidence of an oropharyngeal dysphagia marked by delayed oral transit time on puree food, delayed pharyngeal swallows and s/s indicative of laryngeal penetration/aspiration on thin liquid.     Recommendations: Dysphagia 1 and honey thick liquid. Small bites of food and small sips of liquid. Mediation crushed in applesauce. Assistance with feeding. Monitor for s/s aspiration/laryngeal penetration. If noted:  D/C p.o. intake, provide non-oral nutrition/hydration/meds, and contact this service @ [phone]     Jory Chambers #[phone]   Speech Pathology 64 yo male admitted for headache x 3 days, found to have B/L subdural collections, Acute SDH in anterior interhemispheric fissure with midline shift, s/p b/l MMA embo, right M3 thrombectomy.  Pt seen for initial bedside swallow evaluation on 6/30. Found to have evidence of an oropharyngeal dysphagia and dysarthria. Recommendations at that time included NPO, with non-oral nutrition/hydration/medications.     Pt seen today for bedside swallow re-evaluation.     Pt found in bed, awake and alert. On nasal cannula. Daughter at bedside. +Asymmetry of face, (Left side weakness). Mild dysarthria noted. Following commands for evaluation purposes and communicating wants and needs. Baseline throat clearing noted. PO trials administered-> thin puree, thick puree and honey thick liquid-> adequate labial and oral grading to utensil. Delayed oral transit time on purees evident. Noted delays in the trigger of the pharyngeal swallow when increased amount of liquid consumed. No changes in vocal quality, VSS. Nectar thick liquid and thin liquid-> suspect premature spillover, +delays in the trigger of the pharyngeal swallows. Delayed cough post swallow on thin liquid. Baseline throat clearing does not worsen with PO feeding.     Impressions: 64 yo male admitted for headache x 3 days, found to have B/L subdural collections, Acute SDH in anterior interhemispheric fissure with midline shift, s/p b/l MMA embo, right M3 thrombectomy. Pt presents with evidence of an oropharyngeal dysphagia marked by delayed oral transit time on puree food, delayed pharyngeal swallows and s/s indicative of laryngeal penetration/aspiration on thin liquid.     Recommendations: Dysphagia 1 and honey thick liquid. Small bites of food and small sips of liquid. Mediation crushed in applesauce. Assistance with feeding. Monitor for s/s aspiration/laryngeal penetration. If noted:  D/C p.o. intake, provide non-oral nutrition/hydration/meds, and contact this service @ [phone]     D/W NO Rosario RN Savana, daughter and patient.     Jory Looetta #[phone]   Speech Pathology

## 2021-07-01 NOTE — DIETITIAN INITIAL EVALUATION ADULT. - PERTINENT LABORATORY DATA
(6/30): Hgb 12.9, Hct 38.8, Glu 149  (6/29): Chol 266, HDL 39, A1c 5.8%, Estimated Glucose 120, T3 59

## 2021-07-01 NOTE — DIETITIAN INITIAL EVALUATION ADULT. - OTHER INFO
Dosing wt (6/29): 199.5 lbs.     Pt currently NPO; s/p swallow evaluation 6/30. Pending repeat swallow evaluation 7/1.     Skin: surgical incision right groin s/p angio/thrombectomy   Edema: no edema present  GI: no documented BM's recorded thus far. Noted with emesis (x 3) on 6/28. Continues on Zofran PRN, Miralax, and senna.    A1c 5.8%, indicating pre-DM. No indication of prior medical hx of DM/hx of antihyperglycemic agents. Dosing wt (6/29): 199.5 lbs. Per spouse, UBW ~195-200 lbs. Appears consistent, will monitor.     Pt currently NPO; s/p swallow evaluation 6/30 (failed). Pending repeat swallow evaluation 7/1. Pt endorsed feeling hungry and requesting PO diet, however appeared amenable to NGT if necessary.     Skin: surgical incision right groin s/p angio/thrombectomy   Edema: no edema present  GI: no documented BM's recorded thus far. Noted with emesis (x 3) on 6/28. Continues on Zofran PRN, Miralax, and senna.    A1c 5.8%, indicating pre-DM. No indication of prior medical hx of DM/hx of antihyperglycemic agents. Spouse denies adherence to therapeutic diet PTA. Will monitor.

## 2021-07-01 NOTE — DIETITIAN INITIAL EVALUATION ADULT. - ENTERAL
If EN feeds warranted, recommend Glucerna 1.2 at 70ml/hr x 24 hrs. To provide (based on dosing wt 90.7kg): 1680ml, 2016kcal (22kcal/kg) and 101g protein (1.1g protein/kg)

## 2021-07-01 NOTE — DIETITIAN INITIAL EVALUATION ADULT. - ORAL INTAKE PTA/DIET HISTORY
Pt observed resting in bed with spouse present. Per spouse, reports pt with good appetite PTA. Consumed at baseline 2 meals (breakfast, late lunch) and small snack in evening. Denies prior intolerance to chewing/swallowing. Denies food allergy, however endorsed possible intolerance to avocado. Generally enjoys most foods, specifically noted rice and beans. Denies hx of taking vitamins/supplements PTA. Denies N/V/C/diarrhea at baseline.

## 2021-07-01 NOTE — DIETITIAN INITIAL EVALUATION ADULT. - CHIEF COMPLAINT
The patient is a 64 yo M with no known PMH. Presented with c/o worsening b/l temporal and occipital HA over the past 3 days. Found to have b/l mixed density SDH; incidentally found to have 1-2mm proximal R A1 aneurysm with 1mm neck. Pt s/p angio (6/29): b/l MMA embolization, R M3 thrombectomy with Integrilin and milrinone.

## 2021-07-01 NOTE — DIETITIAN INITIAL EVALUATION ADULT. - REASON INDICATOR FOR ASSESSMENT
Pt back from CT   Nutrition Assessment warranted for length of stay.  Information obtained from: RN, comprehensive chart review, interdisciplinary medical rounds

## 2021-07-02 PROCEDURE — 99222 1ST HOSP IP/OBS MODERATE 55: CPT

## 2021-07-02 PROCEDURE — 99232 SBSQ HOSP IP/OBS MODERATE 35: CPT

## 2021-07-02 RX ORDER — LEVETIRACETAM 250 MG/1
500 TABLET, FILM COATED ORAL
Refills: 0 | Status: DISCONTINUED | OUTPATIENT
Start: 2021-07-02 | End: 2021-07-04

## 2021-07-02 RX ORDER — ACETAMINOPHEN 500 MG
1000 TABLET ORAL ONCE
Refills: 0 | Status: DISCONTINUED | OUTPATIENT
Start: 2021-07-02 | End: 2021-07-02

## 2021-07-02 RX ORDER — DEXAMETHASONE 0.5 MG/5ML
4 ELIXIR ORAL ONCE
Refills: 0 | Status: COMPLETED | OUTPATIENT
Start: 2021-07-02 | End: 2021-07-02

## 2021-07-02 RX ORDER — ACETAMINOPHEN 500 MG
1000 TABLET ORAL ONCE
Refills: 0 | Status: COMPLETED | OUTPATIENT
Start: 2021-07-02 | End: 2021-07-02

## 2021-07-02 RX ADMIN — TRAMADOL HYDROCHLORIDE 25 MILLIGRAM(S): 50 TABLET ORAL at 03:34

## 2021-07-02 RX ADMIN — Medication 400 MILLIGRAM(S): at 04:45

## 2021-07-02 RX ADMIN — LEVETIRACETAM 500 MILLIGRAM(S): 250 TABLET, FILM COATED ORAL at 17:29

## 2021-07-02 RX ADMIN — TRAMADOL HYDROCHLORIDE 25 MILLIGRAM(S): 50 TABLET ORAL at 04:04

## 2021-07-02 RX ADMIN — ENOXAPARIN SODIUM 40 MILLIGRAM(S): 100 INJECTION SUBCUTANEOUS at 17:29

## 2021-07-02 RX ADMIN — CHLORHEXIDINE GLUCONATE 1 APPLICATION(S): 213 SOLUTION TOPICAL at 21:13

## 2021-07-02 RX ADMIN — LEVETIRACETAM 500 MILLIGRAM(S): 250 TABLET, FILM COATED ORAL at 06:45

## 2021-07-02 RX ADMIN — Medication 4 MILLIGRAM(S): at 06:45

## 2021-07-02 RX ADMIN — Medication 1000 MILLIGRAM(S): at 05:00

## 2021-07-02 RX ADMIN — SENNA PLUS 2 TABLET(S): 8.6 TABLET ORAL at 21:10

## 2021-07-02 RX ADMIN — ONDANSETRON 4 MILLIGRAM(S): 8 TABLET, FILM COATED ORAL at 03:34

## 2021-07-02 RX ADMIN — ATORVASTATIN CALCIUM 40 MILLIGRAM(S): 80 TABLET, FILM COATED ORAL at 21:11

## 2021-07-02 NOTE — PROGRESS NOTE ADULT - TIME BILLING
see note
66 y/o M  bilateral subdural hematoma chronic with some acute component with midline shift and brain compression    s/p mma embo with coils.  Underwent rmca thrombectomy and intra arterial administration of Integrilin, nicardipine, and milrinone POD 2   CT head stable   neuro exam improving

## 2021-07-02 NOTE — CONSULT NOTE ADULT - SUBJECTIVE AND OBJECTIVE BOX
HPI:  66 yo M with no known PMHx, presents to the ED c/o worsening b/l temporal and occipital HA over the past 3 days. Pt returned from the [place] 2 weeks ago. 1 week ago, noted "whooshing" sensation to his R ear. Saw an ENT, was given oral abx and otic abx drops but only took about 2 doses. He had this headache and it was mild however, 3 days ago, it has worsened. He then felt nausea and vomited a few times. Pt also noting dizziness, described as unsteadiness. The wife, who is at bedside, endorsed that pt appeared unsteady when ambulating. He is also endorsing neck pain/stiffness. He denies fevers, chills, diarrhea, cp, sob, numbness, weakness, visual changes (diplopia, blurred vision, loss of vision), abd pain (28 Jun 2021 11:31)    Patient was admitted to [facility] on 6/28, CTH with b/l mixed density SDH and interhemispheric frontal heme, also diffuse sulcal effacement. CTA w/ 1-2mm proximal R A2 aneurysm w/ 1mm neck. Had angio 6/29, left mma embolization with particles, right mma embolization with coils, rmca thrombectomy, intra arterial Integrilin, nicardipine, milrinone. Patient seen today, wife at bedside, denies pain, headache, feels left arm weakness is due to pain from IV. Reports "panic attack" at night, no sleep x days.     REVIEW OF SYSTEMS  Constitutional - No fever, No weight loss, No fatigue  HEENT - No eye pain, No visual disturbances, No difficulty hearing, No tinnitus, No vertigo, No neck pain  Respiratory - No cough, No wheezing, No shortness of breath  Cardiovascular - No chest pain, No palpitations  Gastrointestinal - No abdominal pain, No nausea, No vomiting, No diarrhea, No constipation  Genitourinary - No dysuria, No frequency, No hematuria, No incontinence  Neurological - No headaches, No memory loss, No loss of strength, No numbness, No tremors  Psychiatric - No depression, No anxiety    VITALS  T(C): 36.5 (07-02-21 @ 07:00), Max: 37 (07-01-21 @ 15:00)  HR: 56 (07-02-21 @ 07:00) (48 - 66)  BP: --  RR: 14 (07-02-21 @ 07:00) (14 - 20)  SpO2: 93% (07-02-21 @ 07:00) (93% - 98%)  Wt(kg): --    PAST MEDICAL & SURGICAL HISTORY  none     SOCIAL HISTORY  Smoking - Denied  EtOH - Denied   Drugs - Denied    FUNCTIONAL HISTORY  Lives with wife, no steps to enter, one flight to bedroom  Independent AMB and ADLs, retired, wife works from home     CURRENT FUNCTIONAL STATUS  7/1 SLP  jeni-pharyngeal dysphagia  Dysphagia 1 and honey thick liquid    6/30 SLP  mild dysarthria and cognitive-linguistic deficits    7/1 PT  bed mobility min assist  transfers min assist  gait min assist x 5 steps     7/1 OT  MoCA 26/30, normal    6/30 OT  bed mobility min assist  transfers min assist     FAMILY HISTORY   no pertinent history in first degree relatives     RECENT LABS/IMAGING  CBC Full  -  ( 30 Jun 2021 21:23 )  WBC Count : 12.65 K/uL  RBC Count : 4.19 M/uL  Hemoglobin : 12.9 g/dL  Hematocrit : 38.8 %  Platelet Count - Automated : 175 K/uL  Mean Cell Volume : 92.6 fl  Mean Cell Hemoglobin : 30.8 pg  Mean Cell Hemoglobin Concentration : 33.2 gm/dL  Auto Neutrophil # : x  Auto Lymphocyte # : x  Auto Monocyte # : x  Auto Eosinophil # : x  Auto Basophil # : x  Auto Neutrophil % : x  Auto Lymphocyte % : x  Auto Monocyte % : x  Auto Eosinophil % : x  Auto Basophil % : x    06-30    136  |  102  |  15  ----------------------------<  149<H>  4.0   |  25  |  0.66    Ca    8.7      30 Jun 2021 21:23  Phos  2.9     06-30  Mg     2.1     06-30    < from: CT Head No Cont (06.30.21 @ 08:16) >    IMPRESSION: Embolic material now identified involving the right middle cranial fossa region.    Bilateral subdural hematomas again seen.      < end of copied text >        ALLERGIES  No Known Allergies      MEDICATIONS   acetaminophen   Tablet .. 650 milliGRAM(s) Oral every 6 hours PRN  acetaminophen 325 mG/butalbital 50 mG/caffeine 40 mG 2 Tablet(s) Oral every 6 hours PRN  atorvastatin 40 milliGRAM(s) Oral at bedtime  chlorhexidine 4% Liquid 1 Application(s) Topical <User Schedule>  enoxaparin Injectable 40 milliGRAM(s) SubCutaneous <User Schedule>  levETIRAcetam 500 milliGRAM(s) Oral two times a day  ondansetron Injectable 4 milliGRAM(s) IV Push every 6 hours PRN  polyethylene glycol 3350 17 Gram(s) Oral two times a day  senna 2 Tablet(s) Oral at bedtime  traMADol 25 milliGRAM(s) Oral every 6 hours PRN      ----------------------------------------------------------------------------------------  PHYSICAL EXAM  Constitutional - NAD, Comfortable, in bed   HEENT - NCAT, EOMI  Neck - Supple, No limited ROM  Chest - Breathing comfortably  Cardiovascular - S1S2   Abdomen - Soft   Extremities - No C/C/E, No calf tenderness   Neurologic Exam -     follow commands                Cognitive - Awake, Alert, AAO to self, place, date, year, situation     Communication - Fluent, + dysarthria     Cranial Nerves - left facial droop      Motor -                     LEFT    UE - ShAB 4/5, EF 4/5, EE 4/5, WE 4/5,  4/5                    RIGHT UE - ShAB 5/5, EF 5/5, EE 5/5, WE 5/5,  5/5                    LEFT    LE - HF 5/5, KE 5/5, DF 5/5, PF 5/5                    RIGHT LE - HF 5/5, KE 5/5, DF 5/5, PF 5/5        Sensory - Intact to LT     Psychiatric - Mood stable, Affect WNL  ----------------------------------------------------------------------------------------  ASSESSMENT/PLAN  65yMale with functional deficits after bilateral subdural hematoma, s/p right mma embolization, rmca thrombectomy  on keppra for seizure prophylaxis   dysphagia diet   Pain - Tylenol, tramadol   DVT PPX - SCDs, lovenox   Rehab - Will continue to follow for ongoing rehab needs and recommendations.   continue bedside PT/OT/SLP  out of bed to chair    Recommend ACUTE inpatient rehabilitation for the functional deficits consisting of 3 hours of therapy/day & 24 hour RN/daily PMR physician for comorbid medical management. Patient will be able to tolerate 3 hours a day.

## 2021-07-02 NOTE — PROGRESS NOTE ADULT - SUBJECTIVE AND OBJECTIVE BOX
Patient seen and examined at bedside.    --Anticoagulation--    Vital Signs Last 24 Hrs  T(C): 36.6 (01 Jul 2021 19:00), Max: 37 (01 Jul 2021 15:00)  T(F): 97.8 (01 Jul 2021 19:00), Max: 98.6 (01 Jul 2021 15:00)  HR: 59 (01 Jul 2021 23:00) (41 - 66)  BP: 141/79 (01 Jul 2021 07:00) (141/79 - 141/79)  BP(mean): 95 (01 Jul 2021 07:00) (95 - 95)  RR: 16 (01 Jul 2021 23:00) (12 - 20)  SpO2: 96% (01 Jul 2021 23:00) (94% - 98%)          Exam:  AAOx3, some dysarthria, L facial improved, LUE is 4-/5 prox, HG 3/5, BLE 5/5

## 2021-07-02 NOTE — CHART NOTE - NSCHARTNOTESELECT_GEN_ALL_CORE
Event Note
Interventional Neuro Radiology/Event Note
Interventional Neuro Radiology/Event Note
Speech and Swallow
Speech and Swallow
VTE risk assessment/Event Note

## 2021-07-02 NOTE — CHART NOTE - NSCHARTNOTEFT_GEN_A_CORE
64 yo male admitted for headache x 3 days, found to have B/L subdural collections, Acute SDH in anterior interhemispheric fissure with midline shift, s/p b/l MMA embo, right M3 thrombectomy.  Pt seen for initial bedside swallow evaluation on 6/30. Found to have evidence of an oropharyngeal dysphagia and dysarthria. Recommendations at that time included NPO, with non-oral nutrition/hydration/medications.     Pt seen today for repeat bedside swallow evaluation.     Pt found OOB in chair, able to communicate needs and follow directions, pleasant and cooperative. Daughter present at bedside. + O2 via NC. + L facial droop and right lingual deviation. PO trials included honey thick, nectar thick, and thin liquids (2 cups of thin liquid via cup and straw). Purees and hard solids also trialed. + Continued baseline throat clears.   Swallow profile revealed adequate oral prep, phase and overtly functional pharyngeal swallow sequence. No increase in frequency or severity of baseline throat clears noted.     Impressions: 64 yo male admitted for headache x 3 days, found to have B/L subdural collections, Acute SDH in anterior interhemispheric fissure with midline shift, s/p b/l MMA embo, right M3 thrombectomy. Pt presents with an improved swallow profile, no subjective signs of laryngeal penetration/aspiration observed on exam (baseline throat clears did not increase in frequency or severity with PO intake).     Recommendations: Soft texture diet with thin liquids. Small bites of food and small sips of liquid. Meds in applesauce. Assistance with feeding. Monitor for s/s aspiration/laryngeal penetration. If noted:  D/C p.o. intake, provide non-oral nutrition/hydration/meds, and contact this service @ [phone]     D/W PA Sussy, daughter and patient.     [facility] #[phone]   Speech Pathology. 64 yo male admitted for headache x 3 days, found to have B/L subdural collections, Acute SDH in anterior interhemispheric fissure with midline shift, s/p b/l MMA embo, right M3 thrombectomy.  Pt seen for initial bedside swallow evaluation on 6/30. Found to have evidence of an oropharyngeal dysphagia and dysarthria. Recommendations at that time included NPO, with non-oral nutrition/hydration/medications.     Pt seen today for repeat bedside swallow evaluation.     Pt found OOB in chair, able to communicate needs and follow directions, pleasant and cooperative. Daughter present at bedside. + O2 via NC. + L facial droop and right lingual deviation. PO trials included honey thick, nectar thick, and thin liquids (2 cups of thin liquid via cup and straw). Purees and hard solids also trialed. + Continued baseline throat clears.   Swallow profile revealed adequate oral prep, phase and overtly functional pharyngeal swallow sequence. No increase in frequency or severity of baseline throat clears noted.     Impressions: 64 yo male admitted for headache x 3 days, found to have B/L subdural collections, Acute SDH in anterior interhemispheric fissure with midline shift, s/p b/l MMA embo, right M3 thrombectomy. Pt presents with an improved swallow profile, no subjective signs of laryngeal penetration/aspiration observed on exam (baseline throat clears did not increase in frequency or severity with PO intake).     Recommendations: Soft texture diet with thin liquids. Small bites of food and small sips of liquid. Meds in applesauce. Assistance with feeding. Monitor for s/s aspiration/laryngeal penetration. If noted:  D/C p.o. intake, provide non-oral nutrition/hydration/meds, and contact this service @ [phone]     D/W PA Sussy, daughter and patient.     Jory Cydney: [phone]   Speech Pathology. 66 yo male admitted for headache x 3 days, found to have B/L subdural collections, Acute SDH in anterior interhemispheric fissure with midline shift, s/p b/l MMA embo, right M3 thrombectomy.  Pt seen for initial bedside swallow evaluation on 6/30. Found to have evidence of an oropharyngeal dysphagia and dysarthria. Recommendations at that time included NPO, with non-oral nutrition/hydration/medications.     Pt seen today for repeat bedside swallow evaluation.     Pt found OOB in chair, able to communicate needs and follow directions, pleasant and cooperative. Daughter present at bedside. + O2 via NC. + L facial droop and right lingual deviation. PO trials included honey thick, nectar thick, and thin liquids (2 cups of thin liquid via cup and straw). Purees and hard solids also trialed. + Continued baseline throat clears.   Swallow profile revealed adequate oral prep, phase and overtly functional pharyngeal swallow sequence. No increase in frequency or severity of baseline throat clears noted.     Impressions: 66 yo male admitted for headache x 3 days, found to have B/L subdural collections, Acute SDH in anterior interhemispheric fissure with midline shift, s/p b/l MMA embo, right M3 thrombectomy. Pt presents with an improved swallow profile, no subjective signs of laryngeal penetration/aspiration observed on exam (baseline throat clears did not increase in frequency or severity with PO intake).     Recommendations: Soft texture diet with thin liquids. Small bites of food and small sips of liquid. Meds in applesauce. Assistance with feeding. Monitor for s/s aspiration/laryngeal penetration. If noted:  D/C p.o. intake, provide non-oral nutrition/hydration/meds, and contact this service @ [phone]     D/W NO Hi RN> Krystal, daughter and patient.     Jory Cydney: [phone]   Speech Pathology.

## 2021-07-02 NOTE — PROGRESS NOTE ADULT - ASSESSMENT
Patient seen and examined at bedside s/p Angio w/ B/L MMAE.  Exam:  AAOx3, some dysarthria, L facial improved, LUE is 4-/5 prox, HG 3/5, BLE 5/5  -pending txt to floor

## 2021-07-02 NOTE — PROGRESS NOTE ADULT - SUBJECTIVE AND OBJECTIVE BOX
History of Present Illness:  History of Present Illness:   64 yo M with no known PMHx, presents to the ED c/o worsening b/l temporal and occipital HA over the past 3 days. Pt returned from the [place] 2 weeks ago. 1 week ago, noted "whooshing" sensation to his R ear. Saw an ENT, was given oral abx and otic abx drops but only took about 2 doses. He had this headache and it was mild however, 3 days ago, it has worsened. He then felt nausea and vomited a few times. Pt also noting dizziness, described as unsteadiness. The wife, who is at bedside, endorsed that pt appeared unsteady when ambulating. He is also endorsing neck pain/stiffness. He denies fevers, chills, diarrhea, cp, sob, numbness, weakness, visual changes (diplopia, blurred vision, loss of vision), abd pain            REVIEW OF SYSTEMS: [ ] Unable to Assess due to neurologic exam   [ x] All ROS addressed below are non-contributory, except:  Neuro: [ ] Headache [ ] Back pain [ ] Numbness [ ] Weakness [ ] Ataxia [ ] Dizziness [ ] Aphasia [ ] Dysarthria [ ] Visual disturbance  Resp: [ ] Shortness of breath/dyspnea, [ ] Orthopnea [ ] Cough  CV: [ ] Chest pain [ ] Palpitation [ ] Lightheadedness [ ] Syncope  Renal: [ ] Thirst [ ] Edema  GI: [ ] Nausea [ ] Emesis [ ] Abdominal pain [ ] Constipation [ ] Diarrhea  Hem: [ ] Hematemesis [ ] bright red blood per rectum  ID: [ ] Fever [ ] Chills [ ] Dysuria  ENT: [ ] Rhinorrhea            Allergies and Intolerances:        Allergies:  	No Known Allergies:     Home Medications:   * Outpatient Medication Status not yet specified  .    Patient History:    Tobacco Screening:  · Core Measure Site	No    Risk Assessment:    Present on Admission:  Deep Venous Thrombosis	no  Pulmonary Embolus	no     Heart Failure:  Does this patient have a history of or has been diagnosed with heart failure? unknown.    HIV Screen (per [facility], HIV screening must be offered to every individual between ages 13 and 64)	Not applicable (known HIV positive status)          EVENTS:   s/p MMA embolization   Underwent right mma embo with coils.  Underwent rmca thrombectomy and intra arterial administration of Integrilin, nicardipine, and milrinone    overnight event:  neuro exam improving    T(C): 36.5 (07-02-21 @ 07:00), Max: 37 (07-01-21 @ 15:00)  HR: 56 (07-02-21 @ 07:00) (53 - 66)  BP: --  RR: 14 (07-02-21 @ 07:00) (14 - 20)  SpO2: 93% (07-02-21 @ 07:00) (93% - 97%)  07-01-21 @ 07:01  -  07-02-21 @ 07:00  --------------------------------------------------------  IN: 1240 mL / OUT: 1600 mL / NET: -360 mL    07-02-21 @ 07:01  -  07-02-21 @ 11:10  --------------------------------------------------------  IN: 200 mL / OUT: 200 mL / NET: 0 mL    acetaminophen   Tablet .. 650 milliGRAM(s) Oral every 6 hours PRN  acetaminophen 325 mG/butalbital 50 mG/caffeine 40 mG 2 Tablet(s) Oral every 6 hours PRN  atorvastatin 40 milliGRAM(s) Oral at bedtime  chlorhexidine 4% Liquid 1 Application(s) Topical <User Schedule>  enoxaparin Injectable 40 milliGRAM(s) SubCutaneous <User Schedule>  levETIRAcetam 500 milliGRAM(s) Oral two times a day  ondansetron Injectable 4 milliGRAM(s) IV Push every 6 hours PRN  polyethylene glycol 3350 17 Gram(s) Oral two times a day  senna 2 Tablet(s) Oral at bedtime  traMADol 25 milliGRAM(s) Oral every 6 hours PRN      PHYSICAL EXAM:  General: No Acute Distress   Neurological: Drowsy, alert oriented to person, place and time, Following Commands, PERRL, EOMI,  dysarthria, left facial , left biceps 4/5, triceps 4/5, hand  4 , left leg 5/5, right side 5/5   Pulmonary: Clear to Auscultation, No Rales, No Rhonchi, No Wheezes   Cardiovascular: S1, S2, Regular Rate and Rhythm   Gastrointestinal: Soft, Nontender, Nondistended   Extremities: No calf tenderness , no groin hematoma             LABS:  Na: 136 (06-30 @ 21:23), 138 (06-29 @ 20:56)  K: 4.0 (06-30 @ 21:23), 3.7 (06-29 @ 20:56)  Cl: 102 (06-30 @ 21:23), 103 (06-29 @ 20:56)  CO2: 25 (06-30 @ 21:23), 24 (06-29 @ 20:56)  BUN: 15 (06-30 @ 21:23), 12 (06-29 @ 20:56)  Cr: 0.66 (06-30 @ 21:23), 0.66 (06-29 @ 20:56)  Glu: 149(06-30 @ 21:23), 141(06-29 @ 20:56)    Hgb: 12.9 (06-30 @ 21:23), 13.1 (06-29 @ 20:56)  Hct: 38.8 (06-30 @ 21:23), 39.0 (06-29 @ 20:56)  WBC: 12.65 (06-30 @ 21:23), 15.01 (06-29 @ 20:56)  Plt: 175 (06-30 @ 21:23), 190 (06-29 @ 20:56)    INR:   PTT:           A/P:  64 y/o M  bilateral subdural hematoma chronic with some acute component with midline shift and brain compression   CTA showed right A2 aneurysm   Underwent right mma embo with coils.  Underwent rmca thrombectomy and intra arterial administration of Integrilin, nicardipine, and milrinone POD 3    Neuro: neuro checks q 4  hr  Keppra 500 mg BID for seizure prophylaxis   tylenol   PT/OT/OBC      Respiratory: on NC 2 L    desaturating  chest xray   IS   venous dopplers on 6/30 neg     CV:  sinus bradycardia   sick euthyroid syndrome   NSR   TTE EF 50-55 %   -160 mmhg    Endocrine: target euglycemia     Heme/Onc: INR <1.5, Plt>100      DVT ppx: SCD, lovenox   Doppler B/L LE  no DVT     Renal: IVL     ID: Afebrile    GI: dysphagia 1 diet   senna and miralax ( patient refusing bowel meds)     Social/Family: updated at bedside    Discharge planning: floor with tele   Code Status: [x] Full Code [] DNR [] DNI [] Goals of Care:     Disposition: [] ICU [] Stroke Unit [] RCU []PCU [x]Floor [] Discharge Home       moderate complex medical decision       History of Present Illness:  History of Present Illness:   66 yo M with no known PMHx, presents to the ED c/o worsening b/l temporal and occipital HA over the past 3 days. Pt returned from the [place] 2 weeks ago. 1 week ago, noted "whooshing" sensation to his R ear. Saw an ENT, was given oral abx and otic abx drops but only took about 2 doses. He had this headache and it was mild however, 3 days ago, it has worsened. He then felt nausea and vomited a few times. Pt also noting dizziness, described as unsteadiness. The wife, who is at bedside, endorsed that pt appeared unsteady when ambulating. He is also endorsing neck pain/stiffness. He denies fevers, chills, diarrhea, cp, sob, numbness, weakness, visual changes (diplopia, blurred vision, loss of vision), abd pain            REVIEW OF SYSTEMS: [ ] Unable to Assess due to neurologic exam   [ x] All ROS addressed below are non-contributory, except:  Neuro: [ ] Headache [ ] Back pain [ ] Numbness [ ] Weakness [ ] Ataxia [ ] Dizziness [ ] Aphasia [ ] Dysarthria [ ] Visual disturbance  Resp: [ ] Shortness of breath/dyspnea, [ ] Orthopnea [ ] Cough  CV: [ ] Chest pain [ ] Palpitation [ ] Lightheadedness [ ] Syncope  Renal: [ ] Thirst [ ] Edema  GI: [ ] Nausea [ ] Emesis [ ] Abdominal pain [ ] Constipation [ ] Diarrhea  Hem: [ ] Hematemesis [ ] bright red blood per rectum  ID: [ ] Fever [ ] Chills [ ] Dysuria  ENT: [ ] Rhinorrhea            Allergies and Intolerances:        Allergies:  	No Known Allergies:     Home Medications:   * Outpatient Medication Status not yet specified  .    Patient History:    Tobacco Screening:  · Core Measure Site	No    Risk Assessment:    Present on Admission:  Deep Venous Thrombosis	no  Pulmonary Embolus	no     Heart Failure:  Does this patient have a history of or has been diagnosed with heart failure? unknown.    HIV Screen (per [facility] of [facility], HIV screening must be offered to every individual between ages 13 and 64)	Not applicable (known HIV positive status)          EVENTS:   s/p MMA embolization   Underwent right mma embo with coils.  Underwent rmca thrombectomy and intra arterial administration of Integrilin, nicardipine, and milrinone    overnight event:  neuro exam improving    T(C): 36.5 (07-02-21 @ 07:00), Max: 37 (07-01-21 @ 15:00)  HR: 56 (07-02-21 @ 07:00) (53 - 66)  BP: --  RR: 14 (07-02-21 @ 07:00) (14 - 20)  SpO2: 93% (07-02-21 @ 07:00) (93% - 97%)  07-01-21 @ 07:01  -  07-02-21 @ 07:00  --------------------------------------------------------  IN: 1240 mL / OUT: 1600 mL / NET: -360 mL    07-02-21 @ 07:01  -  07-02-21 @ 11:10  --------------------------------------------------------  IN: 200 mL / OUT: 200 mL / NET: 0 mL    acetaminophen   Tablet .. 650 milliGRAM(s) Oral every 6 hours PRN  acetaminophen 325 mG/butalbital 50 mG/caffeine 40 mG 2 Tablet(s) Oral every 6 hours PRN  atorvastatin 40 milliGRAM(s) Oral at bedtime  chlorhexidine 4% Liquid 1 Application(s) Topical <User Schedule>  enoxaparin Injectable 40 milliGRAM(s) SubCutaneous <User Schedule>  levETIRAcetam 500 milliGRAM(s) Oral two times a day  ondansetron Injectable 4 milliGRAM(s) IV Push every 6 hours PRN  polyethylene glycol 3350 17 Gram(s) Oral two times a day  senna 2 Tablet(s) Oral at bedtime  traMADol 25 milliGRAM(s) Oral every 6 hours PRN      PHYSICAL EXAM:  General: No Acute Distress   Neurological: Drowsy, alert oriented to person, place and time, Following Commands, PERRL, EOMI,  dysarthria, left facial , left biceps 4/5, triceps 4/5, hand  4 , left leg 5/5, right side 5/5   Pulmonary: Clear to Auscultation, No Rales, No Rhonchi, No Wheezes   Cardiovascular: S1, S2, Regular Rate and Rhythm   Gastrointestinal: Soft, Nontender, Nondistended   Extremities: No calf tenderness , no groin hematoma             LABS:  Na: 136 (06-30 @ 21:23), 138 (06-29 @ 20:56)  K: 4.0 (06-30 @ 21:23), 3.7 (06-29 @ 20:56)  Cl: 102 (06-30 @ 21:23), 103 (06-29 @ 20:56)  CO2: 25 (06-30 @ 21:23), 24 (06-29 @ 20:56)  BUN: 15 (06-30 @ 21:23), 12 (06-29 @ 20:56)  Cr: 0.66 (06-30 @ 21:23), 0.66 (06-29 @ 20:56)  Glu: 149(06-30 @ 21:23), 141(06-29 @ 20:56)    Hgb: 12.9 (06-30 @ 21:23), 13.1 (06-29 @ 20:56)  Hct: 38.8 (06-30 @ 21:23), 39.0 (06-29 @ 20:56)  WBC: 12.65 (06-30 @ 21:23), 15.01 (06-29 @ 20:56)  Plt: 175 (06-30 @ 21:23), 190 (06-29 @ 20:56)    INR:   PTT:           A/P:  66 y/o M  bilateral subdural hematoma chronic with some acute component with midline shift and brain compression   CTA showed right A2 aneurysm   Underwent right mma embo with coils.  Underwent rmca thrombectomy and intra arterial administration of Integrilin, nicardipine, and milrinone POD 3    Neuro: neuro checks q 4  hr  Keppra 500 mg BID for seizure prophylaxis   tylenol   PT/OT/OBC      Respiratory: on NC 2 L    desaturating  chest xray   IS   venous dopplers on 6/30 neg     CV:  sinus bradycardia   sick euthyroid syndrome   NSR   d/c a line   TTE EF 50-55 %   -160 mmhg    Endocrine: target euglycemia     Heme/Onc: INR <1.5, Plt>100      DVT ppx: SCD, lovenox   Doppler B/L LE  no DVT     Renal: IVL     ID: Afebrile    GI: dysphagia 1 diet   senna and miralax ( patient refusing bowel meds)     Social/Family: updated at bedside    Discharge planning: floor with tele   Code Status: [x] Full Code [] DNR [] DNI [] Goals of Care:     Disposition: [] ICU [] Stroke Unit [] RCU []PCU [x]Floor [] Discharge Home       moderate complex medical decision

## 2021-07-03 PROCEDURE — 99233 SBSQ HOSP IP/OBS HIGH 50: CPT

## 2021-07-03 RX ADMIN — LEVETIRACETAM 500 MILLIGRAM(S): 250 TABLET, FILM COATED ORAL at 05:49

## 2021-07-03 RX ADMIN — ATORVASTATIN CALCIUM 40 MILLIGRAM(S): 80 TABLET, FILM COATED ORAL at 21:06

## 2021-07-03 RX ADMIN — TRAMADOL HYDROCHLORIDE 25 MILLIGRAM(S): 50 TABLET ORAL at 23:40

## 2021-07-03 RX ADMIN — CHLORHEXIDINE GLUCONATE 1 APPLICATION(S): 213 SOLUTION TOPICAL at 21:06

## 2021-07-03 RX ADMIN — Medication 650 MILLIGRAM(S): at 08:52

## 2021-07-03 RX ADMIN — POLYETHYLENE GLYCOL 3350 17 GRAM(S): 17 POWDER, FOR SOLUTION ORAL at 05:49

## 2021-07-03 RX ADMIN — Medication 650 MILLIGRAM(S): at 21:11

## 2021-07-03 RX ADMIN — Medication 650 MILLIGRAM(S): at 08:00

## 2021-07-03 RX ADMIN — ENOXAPARIN SODIUM 40 MILLIGRAM(S): 100 INJECTION SUBCUTANEOUS at 17:24

## 2021-07-03 RX ADMIN — LEVETIRACETAM 500 MILLIGRAM(S): 250 TABLET, FILM COATED ORAL at 17:24

## 2021-07-03 NOTE — PHYSICAL THERAPY INITIAL EVALUATION ADULT - PRECAUTIONS/LIMITATIONS, REHAB EVAL
s/p bilateral MMA embolization & right M3 thrombectomy, w/ intergrillin & Milrinone given 6/29./fall precautions

## 2021-07-03 NOTE — PHYSICAL THERAPY INITIAL EVALUATION ADULT - MANUAL MUSCLE TESTING RESULTS, REHAB EVAL
RUE/BLE >/=3, LUE shoulder 2+, elbow 2+, wrist 0, hand 0
R UE/LE: 5/5 throughout, LUE/LE: 3+/5 throughout

## 2021-07-03 NOTE — PHYSICAL THERAPY INITIAL EVALUATION ADULT - GENERAL OBSERVATIONS, REHAB EVAL
received supine with head of bed elevated +iv, +a-line, +bilateral sequential compression devices, +O2 via nc
Pt received sitting EOB in NAD, +IVL, spouse and daughter bedside, VSS, eager to participate in therapy at this time.

## 2021-07-03 NOTE — PROGRESS NOTE ADULT - ASSESSMENT
A/P:  66 y/o M  bilateral subdural hematoma chronic with some acute component with midline shift and brain compression   CTA showed right A2 aneurysm   6/29/21 s/p bilateral MMA embolization for bilateral SDHs; s/p right M3/ RMCA thrombectomy and intra arterial administration of Integrilin, nicardipine, and milrinone.    Neuro: neuro checks q 4  hr  Keppra 500 mg BID for seizure prophylaxis   tylenol/ tramadol PRN pain  mild left hemiparesis, will monitor  Effacement of suprasellar cistern. This could be due to either increased intracranial pressure or intracranial hypotension, as the hypothalamus appears to be somewhat sagging on MRI, as noted above.   CTH in am   hyperlipidemia on lipitor, will need outpt f/u    Respiratory:   no further desaturations  incentive spirometer   venous dopplers on 6/30 neg     CV: sinus bradycardia   sick euthyroid syndrome   NSR   TTE EF 50-55 %   -160 mmhg    Endocrine: target euglycemia     Heme/Onc: INR <1.5, Plt>100      DVT ppx: SCD, lovenox   Doppler B/L LE no DVT     Renal: IVL     ID: Afebrile    GI: soft diet  senna and miralax ( patient refusing bowel meds)     PT/OT/PMR- recommended acute rehab, however ambulating halls independently had him re- evaluated and now recommended for   outpatient PT/OT upon d/c  f/u am labs    will discuss with Dr Mas  [id number]

## 2021-07-03 NOTE — PHYSICAL THERAPY INITIAL EVALUATION ADULT - PHYSICAL ASSIST/NONPHYSICAL ASSIST: STAND/SIT, REHAB EVAL
verbal cues/nonverbal cues (demo/gestures)/2 person assist
supervision/verbal cues/nonverbal cues (demo/gestures)/1 person assist

## 2021-07-03 NOTE — PHYSICAL THERAPY INITIAL EVALUATION ADULT - PERTINENT HX OF CURRENT PROBLEM, REHAB EVAL
66 yo M presents to [facility] ED with c/o worsening bilateral temporal & occipital HA x3 days.  1wk ago, noted "whooshing" sensation R ear. Saw an ENT, was given oral abx & otic abx drops but only took ~2 doses. He had this headache & it was mild however, it progressively worsened overtime. He then felt nausea & vomited a few times. Pt also noting dizziness, described as unsteadiness & wife endorsed unsteady gait. Pt c/o neck pain/stiffness. CTH: bilateral SDH & acute subdural hemorrhage. cont...

## 2021-07-03 NOTE — PHYSICAL THERAPY INITIAL EVALUATION ADULT - BALANCE TRAINING, PT EVAL
GOAL: (to be met in 4wks) increased sitting and standing balance to good to decreased risk of falls
GOAL: (to be met in 4wks) increased sitting and standing balance to good to decreased risk of falls

## 2021-07-03 NOTE — PHYSICAL THERAPY INITIAL EVALUATION ADULT - STRENGTHENING, PT EVAL
GOAL: (to be met in 4 wks) increased BUE/BLE strength to 5/5 to decrease assistance with functional activities
GOAL: (to be met in 4 wks) increased BUE/BLE strength to 5/5 to decrease assistance with functional activities

## 2021-07-03 NOTE — PHYSICAL THERAPY INITIAL EVALUATION ADULT - CRITERIA FOR SKILLED THERAPEUTIC INTERVENTIONS
impairments found
impairments found/functional limitations in following categories/therapy frequency/predicted duration of therapy intervention/anticipated equipment needs at discharge/anticipated discharge recommendation

## 2021-07-03 NOTE — PROGRESS NOTE ADULT - SUBJECTIVE AND OBJECTIVE BOX
History of Present Illness:   66 yo M with no known PMHx, presents to the ED c/o worsening b/l temporal and occipital HA over the past 3 days. Pt returned from the [place] 2 weeks ago. 1 week ago, noted "whooshing" sensation to his R ear. Saw an ENT, was given oral abx and otic abx drops but only took about 2 doses. He had this headache and it was mild however, 3 days ago, it has worsened. He then felt nausea and vomited a few times. Pt also noting dizziness, described as unsteadiness. The wife, who is at bedside, endorsed that pt appeared unsteady when ambulating. He is also endorsing neck pain/stiffness. He denies fevers, chills, diarrhea, cp, sob, numbness, weakness, visual changes (diplopia, blurred vision, loss of vision), abd pain    SUBJECTIVE: ambulating halls, feeling well, anxious to go home, denies HAs/N/V visual changes    OVERNIGHT EVENTS: [place] [facility]     Vital Signs Last 24 Hrs  T(C): 36.7 (03 Jul 2021 11:41), Max: 37.1 (03 Jul 2021 08:50)  T(F): 98 (03 Jul 2021 11:41), Max: 98.7 (03 Jul 2021 08:50)  HR: 70 (03 Jul 2021 12:33) (56 - 71)  BP: 128/78 (03 Jul 2021 12:33) (104/62 - 150/87)  BP(mean): --  RR: 18 (03 Jul 2021 11:41) (18 - 18)  SpO2: 98% (03 Jul 2021 12:33) (97% - 99%)    MEDICATIONS  (STANDING):  atorvastatin 40 milliGRAM(s) Oral at bedtime  chlorhexidine 4% Liquid 1 Application(s) Topical <User Schedule>  enoxaparin Injectable 40 milliGRAM(s) SubCutaneous <User Schedule>  levETIRAcetam 500 milliGRAM(s) Oral two times a day  polyethylene glycol 3350 17 Gram(s) Oral two times a day  senna 2 Tablet(s) Oral at bedtime    MEDICATIONS  (PRN):  acetaminophen   Tablet .. 650 milliGRAM(s) Oral every 6 hours PRN Temp greater or equal to 38C (100.4F), Mild Pain (1 - 3)  acetaminophen 325 mG/butalbital 50 mG/caffeine 40 mG 2 Tablet(s) Oral every 6 hours PRN moderate to severe headached  ondansetron Injectable 4 milliGRAM(s) IV Push every 6 hours PRN Nausea and/or Vomiting  traMADol 25 milliGRAM(s) Oral every 6 hours PRN Moderate Pain (4 - 6)    LABS: no new labs    PHYSICAL EXAM:  General: No Acute Distress   Neurological: alert oriented to person, place and time, Following Commands, PERRL, EOMI,  mild dysarthria, +left facial, left side 4+/5, clumsy left hand; right side 5/5   Pulmonary: Clear to Auscultation, No Rales, No Rhonchi, No Wheezes   Cardiovascular: S1, S2, Regular Rate and Rhythm   Gastrointestinal: Soft, Nontender, Nondistended   Extremities: No calf tenderness , no groin hematoma     IMAGING:  < from: MR Head w/wo IV Cont (06.29.21 @ 12:40) >  INTERPRETATION:  CLINICAL INDICATION: Worsening headache and dizziness        MRI brain with and without contrast:      Magnetic resonance imaging of the brain was carried out with transaxial SPGR, FLAIR, fast spin echo T2 weighted images, axial susceptibility weighted series, diffusion weighted series and sagittal T1 weighted series on a 1.5 Ita magnet. Post contrast axial, coronal and sagittal T1 weighted images were obtained. 10 cc of Gadavist were intravenously injected, 0 cc were discarded.    Comparison is made with the prior CT/CTA of 6/28/2021.        There are bilateral subdural hematomas which demonstrate hematocrit effects and hemorrhage which appears to be subacute with mild enhancement. There is bilateral sulcal effacement as well as mass effect on the ventricles. There is also a small amount of anterior interhemispheric hemorrhage which appears more acute. Old hemorrhage is identified on the susceptibility weighted series involving the subdural collections.        The sagittal view demonstrates mild effacement of the suprasellar cistern. The tonsils do not extend through the foramen magnum. This may be due to intracranial hypertension or hypotension with sagging of the hypothalamus into the suprasellar cistern.    After contrast administration there is normal intracranial vascular enhancement. No abnormal parenchymal or leptomeningeal enhancement is identified.    MRA head:    Magnetic resonance angiography of the intracranial circulation centered the [id number] was evaluated using 3D time-of-flight technique. 2-D MIP and 3-D reconstructions were obtained and compared with the wire CTA.    No aneurysms are identified.      The distal cervical, petrous, cavernous and supraclinoid internal carotid arteries, are unremarkable. 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 The anterior communicating artery is normal. There is no aneurysm. The middle cerebral arteries are normal.    The distal vertebral arteries, and region of the vertebral basilar confluence are unremarkable. The right vertebral artery is dominant. The basilar artery and posterior cerebral arteries are unremarkable. The superior cerebellar arteries are normal. A prominent right posterior communicating artery is identified.        MRI spine :    Magnetic resonance imaging of the cervical, thoracic and lumbosacral spine was carried out using the cord compression protocol with and without contrast. Sagittal T1 and inversion recovery T2 series from C1 through sacrum. 10cc of Gadavist were intravenously injected.    The cervical, thoracic and lumbosacral vertebral bodies are normal in height and signal intensity. There is a transitional vertebra present with lumbarization of the S1 vertebral body. The last well formed intervertebral disc will be referred to as S1-S2. There is a mild annular bulge at L3-4. There is moderate spinal stenosis at the L5-S1 level. No intraspinal mass or hemorrhage is identified. No abnormal enhancement is identified. There is normal osseous and vascular enhancement.          IMPRESSION:      Chronic bilateral subdural hematomas with acute hemorrhage in the anterior interhemispheric fissure. Effacement of suprasellar cistern. This could be due to either increased intracranial pressure or intracranial hypotension, as the hypothalamus appears to be somewhat sagging. No abnormal parenchymal or leptomeningeal enhancement.    Normal intracranial MRA, no aneurysms.    No intraspinal hemorrhage or mass. Moderate spinal stenosis L5-S1.Lumbarized S1 vertebral body. No abnormal enhancement.    < end of copied text >

## 2021-07-03 NOTE — PHYSICAL THERAPY INITIAL EVALUATION ADULT - LIVES WITH, PROFILE
Pt resides in private home with spouse, +8 steps to enter with a few steps inside and HR assist. Prior to admit, pt reports being functionally independent in all aspects of functional mobility and self care without DME/AD./spouse
spouse

## 2021-07-03 NOTE — PHYSICAL THERAPY INITIAL EVALUATION ADULT - DISCHARGE DISPOSITION, PT EVAL
acute rehab/rehabilitation facility
Home with assist as needed for ADLs from spouse, supervision for ALL aspects of functional mobility and ADLs especially stair negotiation for safety/outpatient PT

## 2021-07-03 NOTE — PHYSICAL THERAPY INITIAL EVALUATION ADULT - PLANNED THERAPY INTERVENTIONS, PT EVAL
stair training: GOAL: (to be met in 4 wks) negotiate 1 flight of stairs with 1 rail and appropriate assistive device with step to step pattern independently/balance training/bed mobility training/gait training/strengthening/transfer training
stair training: GOAL: (to be met in 4 wks) negotiate 1 flight of stairs with 1 rail and appropriate assistive device with step to step pattern independently/balance training/bed mobility training/gait training/strengthening/transfer training

## 2021-07-03 NOTE — PHYSICAL THERAPY INITIAL EVALUATION ADULT - TRANSFER TRAINING, PT EVAL
GOAL: (to be met in 4wks) independent with transfers with appropriate assistive device
GOAL: (to be met in 4wks) independent with transfers with appropriate assistive device

## 2021-07-03 NOTE — PHYSICAL THERAPY INITIAL EVALUATION ADULT - ASR WT BEARING STATUS EVAL
CT brain: Bilateral mixed density lateral convexity subdural collections, Acute subdural hemorrhage in the anterior interhemispheric fissure, Rightward midline shift, Partial effacement of the basal cisterns. No hydrocephalus,No parenchymal hemorrhage or brain edema. CTA brain: 1mm superiorly projecting proximal R A2 segment aneurysm. The neck measures 1 mm in size. Periapical lucency involves the L second mandibular molar & R second maxillary premolar. This may represent the presence of periapical/periodontal disease. CTA neck: neg/no weight-bearing restrictions
CT brain: Bilateral mixed density lateral convexity subdural collections, Acute subdural hemorrhage in the anterior interhemispheric fissure, Rightward midline shift, Partial effacement of the basal cisterns. No hydrocephalus,No parenchymal hemorrhage or brain edema. CTA brain: 1mm superiorly projecting proximal R A2 segment aneurysm. The neck measures 1 mm in size. Periapical lucency involves the L second mandibular molar & R second maxillary premolar. This may represent the presence of periapical/periodontal disease. CTA neck: neg/no weight-bearing restrictions

## 2021-07-03 NOTE — PHYSICAL THERAPY INITIAL EVALUATION ADULT - IMPAIRMENTS FOUND, PT EVAL
aerobic capacity/endurance/fine motor/gait, locomotion, and balance/muscle strength
gait, locomotion, and balance/muscle strength

## 2021-07-03 NOTE — PHYSICAL THERAPY INITIAL EVALUATION ADULT - DIAGNOSIS, PT EVAL
decreased functional mobility due to weakness
Pt presents with mild L sided weakness, impairments with L hand coordination and endurance impacting ability to perform ADLs and functional mobility

## 2021-07-03 NOTE — PHYSICAL THERAPY INITIAL EVALUATION ADULT - GAIT TRAINING, PT EVAL
GOAL: (to be met in 4wks) ambulate 300ft independently with appropriate assistive device
GOAL: (to be met in 4wks) ambulate 300ft independently with appropriate assistive device

## 2021-07-03 NOTE — PHYSICAL THERAPY INITIAL EVALUATION ADULT - BED MOBILITY TRAINING, PT EVAL
GOAL: (to be met in 4wks) independent with bed mobility
GOAL: (to be met in 4wks) independent with bed mobility

## 2021-07-04 ENCOUNTER — TRANSCRIPTION ENCOUNTER (OUTPATIENT)
Age: 66
End: 2021-07-04

## 2021-07-04 VITALS
OXYGEN SATURATION: 98 % | SYSTOLIC BLOOD PRESSURE: 144 MMHG | TEMPERATURE: 99 F | DIASTOLIC BLOOD PRESSURE: 76 MMHG | HEART RATE: 60 BPM | RESPIRATION RATE: 18 BRPM

## 2021-07-04 LAB
ALBUMIN SERPL ELPH-MCNC: 3.4 G/DL — SIGNIFICANT CHANGE UP (ref 3.3–5)
ALP SERPL-CCNC: 65 U/L — SIGNIFICANT CHANGE UP (ref 40–120)
ALT FLD-CCNC: 19 U/L — SIGNIFICANT CHANGE UP (ref 10–45)
ANION GAP SERPL CALC-SCNC: 12 MMOL/L — SIGNIFICANT CHANGE UP (ref 5–17)
AST SERPL-CCNC: 13 U/L — SIGNIFICANT CHANGE UP (ref 10–40)
BASOPHILS # BLD AUTO: 0.04 K/UL — SIGNIFICANT CHANGE UP (ref 0–0.2)
BASOPHILS NFR BLD AUTO: 0.5 % — SIGNIFICANT CHANGE UP (ref 0–2)
BILIRUB SERPL-MCNC: 0.4 MG/DL — SIGNIFICANT CHANGE UP (ref 0.2–1.2)
BUN SERPL-MCNC: 20 MG/DL — SIGNIFICANT CHANGE UP (ref 7–23)
CALCIUM SERPL-MCNC: 9.1 MG/DL — SIGNIFICANT CHANGE UP (ref 8.4–10.5)
CHLORIDE SERPL-SCNC: 102 MMOL/L — SIGNIFICANT CHANGE UP (ref 96–108)
CO2 SERPL-SCNC: 26 MMOL/L — SIGNIFICANT CHANGE UP (ref 22–31)
CREAT SERPL-MCNC: 0.77 MG/DL — SIGNIFICANT CHANGE UP (ref 0.5–1.3)
EOSINOPHIL # BLD AUTO: 0.32 K/UL — SIGNIFICANT CHANGE UP (ref 0–0.5)
EOSINOPHIL NFR BLD AUTO: 4 % — SIGNIFICANT CHANGE UP (ref 0–6)
GLUCOSE SERPL-MCNC: 101 MG/DL — HIGH (ref 70–99)
HCT VFR BLD CALC: 38.5 % — LOW (ref 39–50)
HGB BLD-MCNC: 13 G/DL — SIGNIFICANT CHANGE UP (ref 13–17)
IMM GRANULOCYTES NFR BLD AUTO: 0.2 % — SIGNIFICANT CHANGE UP (ref 0–1.5)
LYMPHOCYTES # BLD AUTO: 2.99 K/UL — SIGNIFICANT CHANGE UP (ref 1–3.3)
LYMPHOCYTES # BLD AUTO: 37.1 % — SIGNIFICANT CHANGE UP (ref 13–44)
MCHC RBC-ENTMCNC: 30.9 PG — SIGNIFICANT CHANGE UP (ref 27–34)
MCHC RBC-ENTMCNC: 33.8 GM/DL — SIGNIFICANT CHANGE UP (ref 32–36)
MCV RBC AUTO: 91.4 FL — SIGNIFICANT CHANGE UP (ref 80–100)
MONOCYTES # BLD AUTO: 0.64 K/UL — SIGNIFICANT CHANGE UP (ref 0–0.9)
MONOCYTES NFR BLD AUTO: 7.9 % — SIGNIFICANT CHANGE UP (ref 2–14)
NEUTROPHILS # BLD AUTO: 4.05 K/UL — SIGNIFICANT CHANGE UP (ref 1.8–7.4)
NEUTROPHILS NFR BLD AUTO: 50.3 % — SIGNIFICANT CHANGE UP (ref 43–77)
NRBC # BLD: 0 /100 WBCS — SIGNIFICANT CHANGE UP (ref 0–0)
PLATELET # BLD AUTO: 174 K/UL — SIGNIFICANT CHANGE UP (ref 150–400)
POTASSIUM SERPL-MCNC: 3.7 MMOL/L — SIGNIFICANT CHANGE UP (ref 3.5–5.3)
POTASSIUM SERPL-SCNC: 3.7 MMOL/L — SIGNIFICANT CHANGE UP (ref 3.5–5.3)
PROT SERPL-MCNC: 6.3 G/DL — SIGNIFICANT CHANGE UP (ref 6–8.3)
RBC # BLD: 4.21 M/UL — SIGNIFICANT CHANGE UP (ref 4.2–5.8)
RBC # FLD: 12.3 % — SIGNIFICANT CHANGE UP (ref 10.3–14.5)
SODIUM SERPL-SCNC: 140 MMOL/L — SIGNIFICANT CHANGE UP (ref 135–145)
WBC # BLD: 8.06 K/UL — SIGNIFICANT CHANGE UP (ref 3.8–10.5)
WBC # FLD AUTO: 8.06 K/UL — SIGNIFICANT CHANGE UP (ref 3.8–10.5)

## 2021-07-04 PROCEDURE — 97162 PT EVAL MOD COMPLEX 30 MIN: CPT

## 2021-07-04 PROCEDURE — 85025 COMPLETE CBC W/AUTO DIFF WBC: CPT

## 2021-07-04 PROCEDURE — C1760: CPT

## 2021-07-04 PROCEDURE — 96375 TX/PRO/DX INJ NEW DRUG ADDON: CPT

## 2021-07-04 PROCEDURE — C1769: CPT

## 2021-07-04 PROCEDURE — 84439 ASSAY OF FREE THYROXINE: CPT

## 2021-07-04 PROCEDURE — 70450 CT HEAD/BRAIN W/O DYE: CPT | Mod: 26

## 2021-07-04 PROCEDURE — 93970 EXTREMITY STUDY: CPT

## 2021-07-04 PROCEDURE — 99239 HOSP IP/OBS DSCHRG MGMT >30: CPT

## 2021-07-04 PROCEDURE — 97130 THER IVNTJ EA ADDL 15 MIN: CPT

## 2021-07-04 PROCEDURE — C1889: CPT

## 2021-07-04 PROCEDURE — 71045 X-RAY EXAM CHEST 1 VIEW: CPT

## 2021-07-04 PROCEDURE — 70486 CT MAXILLOFACIAL W/O DYE: CPT

## 2021-07-04 PROCEDURE — 97168 OT RE-EVAL EST PLAN CARE: CPT

## 2021-07-04 PROCEDURE — 61645 PERQ ART M-THROMBECT &/NFS: CPT

## 2021-07-04 PROCEDURE — 36227 PLACE CATH XTRNL CAROTID: CPT | Mod: 59

## 2021-07-04 PROCEDURE — 97116 GAIT TRAINING THERAPY: CPT

## 2021-07-04 PROCEDURE — 85610 PROTHROMBIN TIME: CPT

## 2021-07-04 PROCEDURE — 80048 BASIC METABOLIC PNL TOTAL CA: CPT

## 2021-07-04 PROCEDURE — 70544 MR ANGIOGRAPHY HEAD W/O DYE: CPT

## 2021-07-04 PROCEDURE — 70496 CT ANGIOGRAPHY HEAD: CPT

## 2021-07-04 PROCEDURE — 96361 HYDRATE IV INFUSION ADD-ON: CPT

## 2021-07-04 PROCEDURE — 85027 COMPLETE CBC AUTOMATED: CPT

## 2021-07-04 PROCEDURE — 84443 ASSAY THYROID STIM HORMONE: CPT

## 2021-07-04 PROCEDURE — 97164 PT RE-EVAL EST PLAN CARE: CPT

## 2021-07-04 PROCEDURE — 82962 GLUCOSE BLOOD TEST: CPT

## 2021-07-04 PROCEDURE — 86769 SARS-COV-2 COVID-19 ANTIBODY: CPT

## 2021-07-04 PROCEDURE — 92610 EVALUATE SWALLOWING FUNCTION: CPT

## 2021-07-04 PROCEDURE — 70553 MRI BRAIN STEM W/O & W/DYE: CPT

## 2021-07-04 PROCEDURE — 80053 COMPREHEN METABOLIC PANEL: CPT

## 2021-07-04 PROCEDURE — 86900 BLOOD TYPING SEROLOGIC ABO: CPT

## 2021-07-04 PROCEDURE — 70498 CT ANGIOGRAPHY NECK: CPT

## 2021-07-04 PROCEDURE — 83735 ASSAY OF MAGNESIUM: CPT

## 2021-07-04 PROCEDURE — 84436 ASSAY OF TOTAL THYROXINE: CPT

## 2021-07-04 PROCEDURE — C1887: CPT

## 2021-07-04 PROCEDURE — 84100 ASSAY OF PHOSPHORUS: CPT

## 2021-07-04 PROCEDURE — 97166 OT EVAL MOD COMPLEX 45 MIN: CPT

## 2021-07-04 PROCEDURE — 99368 TEAM CONF W/O PAT BY HC PRO: CPT

## 2021-07-04 PROCEDURE — 83036 HEMOGLOBIN GLYCOSYLATED A1C: CPT

## 2021-07-04 PROCEDURE — 0225U NFCT DS DNA&RNA 21 SARSCOV2: CPT

## 2021-07-04 PROCEDURE — 75894 X-RAYS TRANSCATH THERAPY: CPT | Mod: 59

## 2021-07-04 PROCEDURE — 99291 CRITICAL CARE FIRST HOUR: CPT

## 2021-07-04 PROCEDURE — 97530 THERAPEUTIC ACTIVITIES: CPT

## 2021-07-04 PROCEDURE — 97129 THER IVNTJ 1ST 15 MIN: CPT

## 2021-07-04 PROCEDURE — 85730 THROMBOPLASTIN TIME PARTIAL: CPT

## 2021-07-04 PROCEDURE — 80061 LIPID PANEL: CPT

## 2021-07-04 PROCEDURE — A9585: CPT

## 2021-07-04 PROCEDURE — 96365 THER/PROPH/DIAG IV INF INIT: CPT

## 2021-07-04 PROCEDURE — 84481 FREE ASSAY (FT-3): CPT

## 2021-07-04 PROCEDURE — 92526 ORAL FUNCTION THERAPY: CPT

## 2021-07-04 PROCEDURE — 84480 ASSAY TRIIODOTHYRONINE (T3): CPT

## 2021-07-04 PROCEDURE — 36224 PLACE CATH CAROTD ART: CPT | Mod: 59

## 2021-07-04 PROCEDURE — 76377 3D RENDER W/INTRP POSTPROCES: CPT

## 2021-07-04 PROCEDURE — 97760 ORTHOTIC MGMT&TRAING 1ST ENC: CPT

## 2021-07-04 PROCEDURE — 70450 CT HEAD/BRAIN W/O DYE: CPT

## 2021-07-04 PROCEDURE — 75898 FOLLOW-UP ANGIOGRAPHY: CPT | Mod: 59

## 2021-07-04 PROCEDURE — 76498 UNLISTED MR PROCEDURE: CPT

## 2021-07-04 PROCEDURE — 36226 PLACE CATH VERTEBRAL ART: CPT | Mod: 59

## 2021-07-04 PROCEDURE — 86901 BLOOD TYPING SEROLOGIC RH(D): CPT

## 2021-07-04 PROCEDURE — 61626 TCAT PERM OCCLS/EMBOL NONCNS: CPT

## 2021-07-04 PROCEDURE — C1894: CPT

## 2021-07-04 PROCEDURE — 97110 THERAPEUTIC EXERCISES: CPT

## 2021-07-04 PROCEDURE — C9399: CPT

## 2021-07-04 PROCEDURE — C8929: CPT

## 2021-07-04 PROCEDURE — 92523 SPEECH SOUND LANG COMPREHEN: CPT

## 2021-07-04 PROCEDURE — 86850 RBC ANTIBODY SCREEN: CPT

## 2021-07-04 PROCEDURE — 93005 ELECTROCARDIOGRAM TRACING: CPT

## 2021-07-04 RX ORDER — POLYETHYLENE GLYCOL 3350 17 G/17G
17 POWDER, FOR SOLUTION ORAL
Qty: 0 | Refills: 0 | DISCHARGE
Start: 2021-07-04

## 2021-07-04 RX ORDER — ACETAMINOPHEN 500 MG
2 TABLET ORAL
Qty: 0 | Refills: 0 | DISCHARGE
Start: 2021-07-04

## 2021-07-04 RX ORDER — LEVETIRACETAM 250 MG/1
1 TABLET, FILM COATED ORAL
Qty: 4 | Refills: 0
Start: 2021-07-04

## 2021-07-04 RX ORDER — SENNA PLUS 8.6 MG/1
2 TABLET ORAL
Qty: 0 | Refills: 0 | DISCHARGE
Start: 2021-07-04

## 2021-07-04 RX ORDER — TRAMADOL HYDROCHLORIDE 50 MG/1
0.5 TABLET ORAL
Qty: 15 | Refills: 0
Start: 2021-07-04

## 2021-07-04 RX ORDER — ATORVASTATIN CALCIUM 80 MG/1
1 TABLET, FILM COATED ORAL
Qty: 30 | Refills: 0
Start: 2021-07-04

## 2021-07-04 RX ADMIN — Medication 650 MILLIGRAM(S): at 12:50

## 2021-07-04 RX ADMIN — LEVETIRACETAM 500 MILLIGRAM(S): 250 TABLET, FILM COATED ORAL at 05:43

## 2021-07-04 RX ADMIN — TRAMADOL HYDROCHLORIDE 25 MILLIGRAM(S): 50 TABLET ORAL at 05:42

## 2021-07-04 RX ADMIN — TRAMADOL HYDROCHLORIDE 25 MILLIGRAM(S): 50 TABLET ORAL at 03:07

## 2021-07-04 RX ADMIN — Medication 650 MILLIGRAM(S): at 12:05

## 2021-07-04 RX ADMIN — Medication 650 MILLIGRAM(S): at 00:26

## 2021-07-04 NOTE — PROGRESS NOTE ADULT - ASSESSMENT
A/P:  66 y/o M  bilateral subdural hematoma chronic with some acute component with midline shift and brain compression   CTA showed right A2 aneurysm   6/29/21 s/p bilateral MMA embolization for bilateral SDHs; s/p right M3/ RMCA thrombectomy and intra arterial administration of Integrilin, nicardipine, and milrinone.    Neuro: neuro checks q 4  hr  Keppra 500 mg BID for seizure prophylaxis   tylenol/ tramadol PRN pain  mild left hemiparesis, will monitor  Effacement of suprasellar cistern. This could be due to either increased intracranial pressure or intracranial hypotension, as the hypothalamus appears to be somewhat sagging on MRI, as noted above.   CTH today stable  hyperlipidemia on lipitor, will need outpt f/u    Respiratory:   no further desaturations  incentive spirometer   venous dopplers on 6/30 neg     CV: sinus bradycardia   NSR   TTE EF 50-55 %   -160 mmhg    Endocrine: target euglycemia     Heme/Onc: INR <1.5, Plt>100      DVT ppx: SCD, lovenox   Doppler B/L LE no DVT     Renal: IVL     ID: Afebrile    GI: soft diet  senna and miralax ( patient refusing bowel meds)     PT/OT/PMR- recommended acute rehab, however ambulating halls independently had him re- evaluated and now recommended for   outpatient PT/OT upon d/c  Discussed with patient and family wound care, follow up plans, activity, and medications. Questions answered, and they verbalized understanding.   RXs sent to pharmacy.   d/c IVl and d/c home today  provided medicine clinic number as he states he doesn't have PMD. Understands the importance of follow up and wife at bedside as well.     will discuss with Dr Mas  [phone]

## 2021-07-04 NOTE — OCCUPATIONAL THERAPY INITIAL EVALUATION ADULT - ADL RETRAINING, OT EVAL
GOAL: Pt will perform LB dressing independently in two weeks
GOAL: Pt will perform LB dressing independently in two weeks

## 2021-07-04 NOTE — DISCHARGE NOTE PROVIDER - NSDCFUADDINST_GEN_ALL_CORE_FT
please notify physician if fevers, bleeding, swelling, pain not relieved by medication, increased sluggishness or irritability, increased nausea or vomiting, inability to tolerate foods or liquids, new or increasing weakness/numbness/tingling.   please do not engage in strenuous activity, heavy lifting, drive, or return to work or school until cleared by surgeon.   Please do not take NSAIDs- ie. advil, motrin, ibuprofen, aleve, aspirin, naproxen, etc. Tylenol/ acetaminophen ok to take.

## 2021-07-04 NOTE — DISCHARGE NOTE PROVIDER - CARE PROVIDER_API CALL
Yoni Mas  Neurological Surgery  [address], [address]  [place] [address]  Phone: [phone]  Fax: [phone]  Follow Up Time:

## 2021-07-04 NOTE — DISCHARGE NOTE PROVIDER - NSDCFUADDAPPT_GEN_ALL_CORE_FT
You have been started on a new medication, tramadol, which helps to control pain. It can be an additive medication so it is important to limit the use of this medication.  Side effects include nausea, vomiting, constipation, dry mouth, lightheadedness, dizziness or drowsiness.  It is important to tell your physician if you experience any of these side effects.  You have been started on a new medication, tramadol, which helps to control pain. It can be an additive medication so it is important to limit the use of this medication.  Side effects include nausea, vomiting, constipation, dry mouth, lightheadedness, dizziness or drowsiness.  It is important to tell your physician if you experience any of these side effects.     [facility] [phone], [address], [address], [place] [address]

## 2021-07-04 NOTE — OCCUPATIONAL THERAPY INITIAL EVALUATION ADULT - DIAGNOSIS, OT EVAL
Patient presents with decreased balance, lue strength, endurance impacting ability to perform ADLs and functional mobility
Patient presents with decreased lue strength, endurance, and coordination impacting ability to perform ADLs

## 2021-07-04 NOTE — PROGRESS NOTE ADULT - PROVIDER SPECIALTY LIST ADULT
NSICU
Neurosurgery
NSICU
Neurosurgery
Neurosurgery

## 2021-07-04 NOTE — OCCUPATIONAL THERAPY INITIAL EVALUATION ADULT - LEVEL OF CONSCIOUSNESS, OT EVAL
alert
Pt scored 5/28 on short blessed exam however deficits might have been more secondary to fatigue/alert

## 2021-07-04 NOTE — DISCHARGE NOTE PROVIDER - NSDCMRMEDTOKEN_GEN_ALL_CORE_FT
acetaminophen 325 mg oral tablet: 2 tab(s) orally every 6 hours, As needed, Temp greater or equal to 38C (100.4F), Mild Pain (1 - 3)  polyethylene glycol 3350 oral powder for reconstitution: 17 gram(s) orally 2 times a day  senna oral tablet: 2 tab(s) orally once a day (at bedtime)   acetaminophen 325 mg oral tablet: 2 tab(s) orally every 6 hours, As needed, Temp greater or equal to 38C (100.4F), Mild Pain (1 - 3)  atorvastatin 40 mg oral tablet: 1 tab(s) orally once a day (at bedtime)  levETIRAcetam 500 mg oral tablet: 1 tab(s) orally 2 times a day  polyethylene glycol 3350 oral powder for reconstitution: 17 gram(s) orally 2 times a day  senna oral tablet: 2 tab(s) orally once a day (at bedtime)  traMADol 50 mg oral tablet: 0.5 tab(s) orally every 6 hours, As needed, Moderate Pain (4 - 6) MDD:2

## 2021-07-04 NOTE — OCCUPATIONAL THERAPY INITIAL EVALUATION ADULT - RANGE OF MOTION EXAMINATION, UPPER EXTREMITY
Left UE Passive ROM was WNL (within normal limits)/Right UE Active ROM was WFL (within functional limits)
bilateral UE Active ROM was WFL  (within functional limits)

## 2021-07-04 NOTE — OCCUPATIONAL THERAPY INITIAL EVALUATION ADULT - MANUAL MUSCLE TESTING RESULTS, REHAB EVAL
RUE/BLE at least 3+/5, LUE shoulder 2+, elbow 2+, wrist 0, hand 0
RUE grossly 5/5, LUE grossly 3+/5 all joints

## 2021-07-04 NOTE — OCCUPATIONAL THERAPY INITIAL EVALUATION ADULT - PLANNED THERAPY INTERVENTIONS, OT EVAL
ADL retraining/IADL retraining/fine motor coordination training/parent/caregiver training...
ADL retraining/bed mobility training/fine motor coordination training/transfer training

## 2021-07-04 NOTE — DISCHARGE NOTE PROVIDER - NSDCACTIVITY_GEN_ALL_CORE
Bathing allowed/Do not drive or operate machinery/Showering allowed/Do not make important decisions/Stairs allowed/Walking - Indoors allowed/No heavy lifting/straining/Walking - Outdoors allowed not applicable

## 2021-07-04 NOTE — OCCUPATIONAL THERAPY INITIAL EVALUATION ADULT - NS ASR FOLLOW COMMAND OT EVAL
100% of the time/able to follow multistep instructions
100% of the time/able to follow multistep instructions/able to follow single-step instructions

## 2021-07-04 NOTE — PROGRESS NOTE ADULT - SUBJECTIVE AND OBJECTIVE BOX
History of Present Illness:   66 yo M with no known PMHx, presents to the ED c/o worsening b/l temporal and occipital HA over the past 3 days. Pt returned from the [place] 2 weeks ago. 1 week ago, noted "whooshing" sensation to his R ear. Saw an ENT, was given oral abx and otic abx drops but only took about 2 doses. He had this headache and it was mild however, 3 days ago, it has worsened. He then felt nausea and vomited a few times. Pt also noting dizziness, described as unsteadiness. The wife, who is at bedside, endorsed that pt appeared unsteady when ambulating. He is also endorsing neck pain/stiffness. He denies fevers, chills, diarrhea, cp, sob, numbness, weakness, visual changes (diplopia, blurred vision, loss of vision), abd pain    SUBJECTIVE: ambulating halls, feeling well, anxious to go home, denies HAs/N/V visual changes    Vital Signs Last 24 Hrs  T(C): 37 (04 Jul 2021 11:00), Max: 37.1 (04 Jul 2021 09:26)  T(F): 98.6 (04 Jul 2021 11:00), Max: 98.7 (04 Jul 2021 09:26)  HR: 60 (04 Jul 2021 11:00) (52 - 72)  BP: 144/76 (04 Jul 2021 11:00) (126/86 - 144/76)  BP(mean): --  RR: 18 (04 Jul 2021 11:00) (18 - 18)  SpO2: 98% (04 Jul 2021 11:00) (97% - 100%)    MEDICATIONS  (STANDING):  atorvastatin 40 milliGRAM(s) Oral at bedtime  enoxaparin Injectable 40 milliGRAM(s) SubCutaneous <User Schedule>  levETIRAcetam 500 milliGRAM(s) Oral two times a day  polyethylene glycol 3350 17 Gram(s) Oral two times a day  senna 2 Tablet(s) Oral at bedtime    MEDICATIONS  (PRN):  acetaminophen   Tablet .. 650 milliGRAM(s) Oral every 6 hours PRN Temp greater or equal to 38C (100.4F), Mild Pain (1 - 3)  ondansetron Injectable 4 milliGRAM(s) IV Push every 6 hours PRN Nausea and/or Vomiting  traMADol 25 milliGRAM(s) Oral every 6 hours PRN Moderate Pain (4 - 6)    LABS:                         13.0   8.06  )-----------( 174      ( 04 Jul 2021 06:37 )             38.5   07-04    140  |  102  |  20  ----------------------------<  101<H>  3.7   |  26  |  0.77    Ca    9.1      04 Jul 2021 06:37    TPro  6.3  /  Alb  3.4  /  TBili  0.4  /  DBili  x   /  AST  13  /  ALT  19  /  AlkPhos  65  07-04    PHYSICAL EXAM:  General: No Acute Distress   Neurological: alert oriented to person, place and time, Following Commands, PERRL, EOMI,  mild dysarthria, +left facial, left side 4+/5, clumsy left hand; right side 5/5   Pulmonary: Clear to Auscultation, No Rales, No Rhonchi, No Wheezes   Cardiovascular: S1, S2, Regular Rate and Rhythm   Gastrointestinal: Soft, Nontender, Nondistended   Extremities: No calf tenderness , no groin hematoma     IMAGING:  < from: CT Head No Cont (07.04.21 @ 08:46) >  IMPRESSION:  Redemonstration of left holohemispheric subdural hematoma measuring up to 7 mm, grossly unchanged in size from prior, however mostly hypoattenuating, compatible with evolution of hemorrhagic blood products. Minimalresidual right-sided subdural hematoma overlying the right posterior temporal lobe measuring up to 3.5 mm. The rest of the right-sided subdural hematoma has resolved. No new areas of hemorrhage are apparent.  Small chronic appearing infarct in the right posterior basal ganglia, not visualized on prior exam.    < end of copied text >          < from: MR Head w/wo IV Cont (06.29.21 @ 12:40) >  INTERPRETATION:  CLINICAL INDICATION: Worsening headache and dizziness        MRI brain with and without contrast:      Magnetic resonance imaging of the brain was carried out with transaxial SPGR, FLAIR, fast spin echo T2 weighted images, axial susceptibility weighted series, diffusion weighted series and sagittal T1 weighted series on a 1.5 [place] magnet. Post contrast axial, coronal and sagittal T1 weighted images were obtained. 10 cc of Gadavist were intravenously injected, 0 cc were discarded.    Comparison is made with the prior CT/CTA of 6/28/2021.        There are bilateral subdural hematomas which demonstrate hematocrit effects and hemorrhage which appears to be subacute with mild enhancement. There is bilateral sulcal effacement as well as mass effect on the ventricles. There is also a small amount of anterior interhemispheric hemorrhage which appears more acute. Old hemorrhage is identified on the susceptibility weighted series involving the subdural collections.        The sagittal view demonstrates mild effacement of the suprasellar cistern. The tonsils do not extend through the foramen magnum. This may be due to intracranial hypertension or hypotension with sagging of the hypothalamus into the suprasellar cistern.    After contrast administration there is normal intracranial vascular enhancement. No abnormal parenchymal or leptomeningeal enhancement is identified.    MRA head:    Magnetic resonance angiography of the intracranial circulation centered the [id number] was evaluated using 3D time-of-flight technique. 2-D MIP and 3-D reconstructions were obtained and compared with the wire CTA.    No aneurysms are identified.      The distal cervical, petrous, cavernous and supraclinoid internal carotid arteries, are unremarkable. 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 The anterior communicating artery is normal. There is no aneurysm. The middle cerebral arteries are normal.    The distal vertebral arteries, and region of the vertebral basilar confluence are unremarkable. The right vertebral artery is dominant. The basilar artery and posterior cerebral arteries are unremarkable. The superior cerebellar arteries are normal. A prominent right posterior communicating artery is identified.        MRI spine :    Magnetic resonance imaging of the cervical, thoracic and lumbosacral spine was carried out using the cord compression protocol with and without contrast. Sagittal T1 and inversion recovery T2 series from C1 through sacrum. 10cc of Gadavist were intravenously injected.    The cervical, thoracic and lumbosacral vertebral bodies are normal in height and signal intensity. There is a transitional vertebra present with lumbarization of the S1 vertebral body. The last well formed intervertebral disc will be referred to as S1-S2. There is a mild annular bulge at L3-4. There is moderate spinal stenosis at the L5-S1 level. No intraspinal mass or hemorrhage is identified. No abnormal enhancement is identified. There is normal osseous and vascular enhancement.          IMPRESSION:      Chronic bilateral subdural hematomas with acute hemorrhage in the anterior interhemispheric fissure. Effacement of suprasellar cistern. This could be due to either increased intracranial pressure or intracranial hypotension, as the hypothalamus appears to be somewhat sagging. No abnormal parenchymal or leptomeningeal enhancement.    Normal intracranial MRA, no aneurysms.    No intraspinal hemorrhage or mass. Moderate spinal stenosis L5-S1.Lumbarized S1 vertebral body. No abnormal enhancement.    < end of copied text >

## 2021-07-04 NOTE — PROGRESS NOTE ADULT - THIS PATIENT HAS THE FOLLOWING CONDITION(S)/DIAGNOSES ON THIS ADMISSION:
Cerebral Edema/Brain Compression / Herniation
None
Brain Compression / Herniation
Brain Compression / Herniation
Cerebral Edema/Brain Compression / Herniation
None
None

## 2021-07-04 NOTE — PROGRESS NOTE ADULT - REASON FOR ADMISSION
6/29/21 s/p bilateral MMA embolization for bilateral SDHs; s/p right M3/ RMCA thrombectomy and intra arterial administration of Integrilin, nicardipine, and milrinone.
bilateral subdural hematoma
6/29/21 s/p bilateral MMA embolization for bilateral SDHs; s/p right M3/ RMCA thrombectomy and intra arterial administration of Integrilin, nicardipine, and milrinone.

## 2021-07-04 NOTE — OCCUPATIONAL THERAPY INITIAL EVALUATION ADULT - GENERAL OBSERVATIONS, REHAB EVAL
received supine with head of bed elevated +iv, +a-line, +bilateral sequential compression devices, +O2 via nc
received supine with head of bed elevated +iv, +a-line, +bilateral sequential compression devices, +O2 via nc

## 2021-07-04 NOTE — PROGRESS NOTE ADULT - NSICDXPILOT_GEN_ALL_CORE
[place]

## 2021-07-04 NOTE — OCCUPATIONAL THERAPY INITIAL EVALUATION ADULT - FINE MOTOR COORDINATION TRAINING, OT EVAL
Goal: Pt will independently manipulate buttons/zippers/fasteners on shirts/pants in 2 weeks to increase independence for ADL performance
Goal: Pt will independently manipulate buttons/zippers/fasteners on shirts/pants in 2 weeks to increase independence for ADL performance

## 2021-07-04 NOTE — OCCUPATIONAL THERAPY INITIAL EVALUATION ADULT - PRECAUTIONS/LIMITATIONS, REHAB EVAL
Saw an ENT, was given oral abx and otic abx drops but only took about 2 doses. He had this headache and it was mild however, 3 days ago, it has worsened. He then felt nausea and vomited a few times. Pt also noting dizziness, described as unsteadiness. The wife, who is at bedside, endorsed that pt appeared unsteady when ambulating. He is also endorsing neck pain/stiffness. CT head showed bilateral subdural hematoma chronic with some acute component with midline shift. CTA showed right A 2 aneurysm. Given acute headache 2 weeks ago, s/o rupture aneurysm, no history of trauma./fall precautions
cont:s/p bilateral MMA embolization & right M3 thrombectomy, w/ intergrillin & Milrinone given 6/29
cont:s/p bilateral MMA embolization & right M3 thrombectomy, w/ intergrillin & Milrinone given 6/29

## 2021-07-04 NOTE — DISCHARGE NOTE PROVIDER - NSDCCPCAREPLAN_GEN_ALL_CORE_FT
PRINCIPAL DISCHARGE DIAGNOSIS  Diagnosis: SDH (subdural hematoma)  Assessment and Plan of Treatment: 6/29/21 s/p bilateral MMA (middle meningeal artery) embolization for bilateral SDHs (subdural hematomas); s/p RMCA (right middle cerebral artery) thrombectomy.   Dr Mas- neurosurgeon- please call office for appointment in 1- 2 weeks.   Primary care physician within 1-2 weeks.      SECONDARY DISCHARGE DIAGNOSES  Diagnosis: Prediabetes  Assessment and Plan of Treatment: Your HGA1C is 5.8, please follow up with your primary care physician within 1-2 weeks.    Diagnosis: HLD (hyperlipidemia)  Assessment and Plan of Treatment: Your total cholesterol is 266 and your HDL is 209. You have been started on a statin and should follow up with your primary care physician within 1-2 weeks.

## 2021-07-04 NOTE — OCCUPATIONAL THERAPY INITIAL EVALUATION ADULT - PERTINENT HX OF CURRENT PROBLEM, REHAB EVAL
64 yo M with no known PMHx, presents to the ED c/o worsening b/l temporal and occipital HA over the past 3 days. Pt returned from the [place] 2 weeks ago. 1 week ago, noted "whooshing" sensation to his R ear.
no known PMHx. presents to ED c/o worsening bilateral temporal & occipital HA x3 days.  1wk ago, noted "whooshing" sensation R ear. Saw an ENT, was given oral abx & otic abx drops but only took ~2 doses. He had this headache & it was mild however, 3 days ago, it has worsened. He then felt nausea & vomited a few times. Pt also noting dizziness, described as unsteadiness & wife endorsed unsteady gait. Pt c/o neck pain/stiffness. CTH: bilateral SDH & acute subdural hemorrhage. cont...
no known PMHx. presents to ED c/o worsening bilateral temporal & occipital HA x3 days.  1wk ago, noted "whooshing" sensation R ear. Saw an ENT, was given oral abx & otic abx drops but only took ~2 doses. He had this headache & it was mild however, 3 days ago, it has worsened. He then felt nausea & vomited a few times. Pt also noting dizziness, described as unsteadiness & wife endorsed unsteady gait. Pt c/o neck pain/stiffness. CTH: bilateral SDH & acute subdural hemorrhage. cont...

## 2021-07-04 NOTE — DISCHARGE NOTE PROVIDER - HOSPITAL COURSE
HPI:  64 yo M with no known PMHx, presents to the ED c/o worsening b/l temporal and occipital HA over the past 3 days. Pt returned from the [place] 2 weeks ago. 1 week ago, noted "whooshing" sensation to his R ear. Saw an ENT, was given oral abx and otic abx drops but only took about 2 doses. He had this headache and it was mild however, 3 days ago, it has worsened. He then felt nausea and vomited a few times. Pt also noting dizziness, described as unsteadiness. The wife, who is at bedside, endorsed that pt appeared unsteady when ambulating. He is also endorsing neck pain/stiffness. He denies fevers, chills, diarrhea, cp, sob, numbness, weakness, visual changes (diplopia, blurred vision, loss of vision), abd pain (28 Jun 2021 11:31)    Patient admitted to [facility]SC[facility] and closely monitored per protocol. CT head revealed bilateral mixed density SDHs. CTA head/neck revealed no flow-limiting stenosis. No AVM.  1 mm superiorly projecting proximal right A2 segment aneurysm. The neck measures 1 mm in size. Periapical lucency involves the left second mandibular molar and right second maxillary premolar. This may represent the presence of periapical/periodontal disease. CTA neck: No flow-limiting stenosis. No evidence for arterial dissection.   MRI/As revealed chronic bilateral subdural hematomas with acute hemorrhage in the anterior interhemispheric fissure. Effacement of suprasellar cistern. This could be due to either increased intracranial pressure or intracranial hypotension, as the hypothalamus appears to be somewhat sagging. No abnormal parenchymal or leptomeningeal enhancement. Normal intracranial MRA, no aneurysms. No intraspinal hemorrhage or mass. Moderate spinal stenosis L5-S1.Lumbarized S1 vertebral body. No abnormal enhancement.     6/29/21 he underwent bilateral MMA (middle meningeal artery) embolization for bilateral SDHs (subdural hematomas); s/p RMCA (right middle cerebral artery) thrombectomy.  Post procedure his weakness improved. 6/30 he underwent surveillance lower extremity duplex negative for DVTs. He was subsequently transferred to the floor. PT/OT/PMR evaluated him and initially recommended acute rehab however after re-evaluation and improvement in clinical status he was recommended for outpatient PT/OT. Serial CTHs on 6/30 and 7/4 showed stable bilateral SDHs. He was evaluated by speech department and recommended a soft diet. On the day of discharge he was medically and neurologically stable for discharge to home.    HPI:  64 yo M with no known PMHx, presents to the ED c/o worsening b/l temporal and occipital HA over the past 3 days. Pt returned from the [place] 2 weeks ago. 1 week ago, noted "whooshing" sensation to his R ear. Saw an ENT, was given oral abx and otic abx drops but only took about 2 doses. He had this headache and it was mild however, 3 days ago, it has worsened. He then felt nausea and vomited a few times. Pt also noting dizziness, described as unsteadiness. The wife, who is at bedside, endorsed that pt appeared unsteady when ambulating. He is also endorsing neck pain/stiffness. He denies fevers, chills, diarrhea, cp, sob, numbness, weakness, visual changes (diplopia, blurred vision, loss of vision), abd pain (28 Jun 2021 11:31)    Patient admitted to NSCU and closely monitored per protocol. CT head revealed bilateral mixed density SDHs. CTA head/neck revealed no flow-limiting stenosis. No AVM.  1 mm superiorly projecting proximal right A2 segment aneurysm. The neck measures 1 mm in size. Periapical lucency involves the left second mandibular molar and right second maxillary premolar. This may represent the presence of periapical/periodontal disease. CTA neck: No flow-limiting stenosis. No evidence for arterial dissection.   MRI/As revealed chronic bilateral subdural hematomas with acute hemorrhage in the anterior interhemispheric fissure. Effacement of suprasellar cistern. This could be due to either increased intracranial pressure or intracranial hypotension, as the hypothalamus appears to be somewhat sagging. No abnormal parenchymal or leptomeningeal enhancement. Normal intracranial MRA, no aneurysms. No intraspinal hemorrhage or mass. Moderate spinal stenosis L5-S1.Lumbarized S1 vertebral body. No abnormal enhancement.     6/29/21 he underwent bilateral MMA (middle meningeal artery) embolization for bilateral SDHs (subdural hematomas); s/p RMCA (right middle cerebral artery) thrombectomy.  Post procedure his weakness improved. 6/30 he underwent surveillance lower extremity duplex negative for DVTs. He was subsequently transferred to the floor. PT/OT/PMR evaluated him and initially recommended acute rehab however after re-evaluation and improvement in clinical status he was recommended for outpatient PT/OT. Serial CTHs on 6/30 and 7/4 showed stable bilateral SDHs. He was evaluated by speech department and recommended a soft diet. On the day of discharge he was medically and neurologically stable for discharge to home.     More than 30 minutes were spent educating the patient and family regarding condition, medications, follow up plans, signs and symptoms to be concerned with, preparing paperwork, and questions answered regarding discharge.

## 2021-07-04 NOTE — OCCUPATIONAL THERAPY INITIAL EVALUATION ADULT - ADDITIONAL COMMENTS
Pt reports he lives in a private home with his spouse. Can enter through garage without steps.+ handrail +1 flight to bedroom. +stall shower, No DME in home. Pt is retired and was independent in all adl's and functional mobility pta
Pt reports he lives in a private home with his spouse. Can enter through garage without steps.+ handrail +1 flight to bedroom. +stall shower, No DME in home. Pt is retired and was independent in all adl's and functional mobility pta

## 2021-07-15 PROBLEM — Z00.00 ENCOUNTER FOR PREVENTIVE HEALTH EXAMINATION: Status: ACTIVE | Noted: 2021-07-15

## 2021-07-22 ENCOUNTER — APPOINTMENT (OUTPATIENT)
Dept: NEUROSURGERY | Facility: CLINIC | Age: 66
End: 2021-07-22
Payer: COMMERCIAL

## 2021-07-22 VITALS
BODY MASS INDEX: 27.35 KG/M2 | TEMPERATURE: 97 F | SYSTOLIC BLOOD PRESSURE: 117 MMHG | WEIGHT: 191 LBS | HEART RATE: 80 BPM | HEIGHT: 70 IN | OXYGEN SATURATION: 100 % | DIASTOLIC BLOOD PRESSURE: 74 MMHG

## 2021-07-22 DIAGNOSIS — S06.5X9A TRAUMATIC SUBDURAL HEMORRHAGE WITH LOSS OF CONSCIOUSNESS OF UNSPECIFIED DURATION, INITIAL ENCOUNTER: ICD-10-CM

## 2021-07-22 DIAGNOSIS — I63.9 CEREBRAL INFARCTION, UNSPECIFIED: ICD-10-CM

## 2021-07-22 PROCEDURE — 99213 OFFICE O/P EST LOW 20 MIN: CPT

## 2021-07-22 NOTE — PHYSICAL EXAM
[General Appearance - Alert] : alert [Person] : oriented to person [General Appearance - In No Acute Distress] : in no acute distress [Place] : oriented to place [Time] : oriented to time [Cranial Nerves Oculomotor (III)] : extraocular motion intact [Cranial Nerves Trigeminal (V)] : facial sensation intact symmetrically [Cranial Nerves Facial (VII)] : face symmetrical [Cranial Nerves Vestibulocochlear (VIII)] : hearing was intact bilaterally [Cranial Nerves Glossopharyngeal (IX)] : tongue and palate midline [Cranial Nerves Accessory (XI - Cranial And Spinal)] : head turning and shoulder shrug symmetric [Cranial Nerves Hypoglossal (XII)] : there was no tongue deviation with protrusion [Motor Strength] : muscle strength was normal in all four extremities [] : no respiratory distress [Abnormal Walk] : normal gait

## 2021-07-26 NOTE — RESULTS/DATA
[FreeTextEntry1] : EXAM: CT BRAIN\par \par \par PROCEDURE DATE: 07/04/2021\par \par \par \par \par INTERPRETATION: INDICATIONS: Follow-up subdural hematoma\par \par TECHNIQUE: Serial axial images were obtained from the skull base to the vertex without intravenous contrast. Coronal and sagittal reformatted images were obtained.\par \par COMPARISON EXAMINATION: CT brain from 6/30/2021\par \par FINDINGS:\par VENTRICLES AND SULCI: No hydrocephalus.\par INTRA-AXIAL: No acute intra-axial hemorrhage, mass effect, or abnormal attenuation.\par EXTRA-AXIAL: Redemonstration of left holohemispheric subdural hematoma measuring up to 7 mm, grossly unchanged in size from prior, however mostly hypoattenuating, compatible with evolution of hemorrhagic blood products. Minimal residual right-sided subdural hematoma overlying the right posterior temporal lobe measuring up to 3.5 mm. The rest of the right-sided subdural hematoma has resolved. No new areas of hemorrhage are apparent. Small chronic appearing infarct in the right posterior basal ganglia, not visualized on prior exam. No midline shift. The basal cisterns are patent.\par VISUALIZED SINUSES: Clear.\par VISUALIZED MASTOIDS: Clear.\par CALVARIUM: No acute displaced fracture..\par MISCELLANEOUS: Embolic material in the right middle cranial fossa is again noted.\par \par IMPRESSION:\par Redemonstration of left holohemispheric subdural hematoma measuring up to 7 mm, grossly unchanged in size from prior, however mostly hypoattenuating, compatible with evolution of hemorrhagic blood products. Minimal residual right-sided subdural hematoma overlying the right posterior temporal lobe measuring up to 3.5 mm. The rest of the right-sided subdural hematoma has resolved. No new areas of hemorrhage are apparent. Small chronic appearing infarct in the right posterior basal ganglia, not visualized on prior exam.\par

## 2021-07-26 NOTE — ASSESSMENT
[FreeTextEntry1] : Impression: 65yr old male with bilateral subdural hematoma; 6/29/2021 bilateral mma embo for Bilateral subdural hematoma \par Patient presents today feeling well general fatigue, headaches resolved, no focal neurological deficit\par \par PLan:\par MRI brain w/wo contrast now then follow up in the office after\par ok to stop keppra

## 2021-07-26 NOTE — HISTORY OF PRESENT ILLNESS
[FreeTextEntry1] : Hospital Course: \par Discharge Date	04-Jul-2021 \par Admission Date	28-Jun-2021 12:20 \par Reason for Admission	6/29/21 s/p bilateral MMA (middle meningeal artery) \par embolization for bilateral SDHs (subdural hematomas); s/p RMCA (right middle \par cerebral artery) thrombectomy. \par Hospital Course	 \par HPI: \par 64 yo M with no known PMHx, presents to the ED c/o worsening b/l temporal and \par occipital HA over the past 3 days. Pt returned from the [place] 2 \par weeks ago. 1 week ago, noted "whooshing" sensation to his R ear. Saw an ENT, \par was given oral abx and otic abx drops but only took about 2 doses. He had this \par headache and it was mild however, 3 days ago, it has worsened. He then felt \par nausea and vomited a few times. Pt also noting dizziness, described as \par unsteadiness. The wife, who is at bedside, endorsed that pt appeared unsteady \par when ambulating. He is also endorsing neck pain/stiffness. He denies fevers, \par chills, diarrhea, cp, sob, numbness, weakness, visual changes (diplopia, \par blurred vision, loss of vision), abd pain (28 Jun 2021 11:31) \par \par Patient admitted to NSCU and closely monitored per protocol. CT head revealed \par bilateral mixed density SDHs. CTA head/neck revealed no flow-limiting stenosis. \par No AVM. 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 The neck \par measures 1 mm in size. Periapical lucency involves the left second mandibular \par molar and right second maxillary premolar. This may represent the presence of \par periapical/periodontal disease. CTA neck: No flow-limiting stenosis. No \par evidence for arterial dissection. \par MRI/As revealed chronic bilateral subdural hematomas with acute hemorrhage in \par the anterior interhemispheric fissure. Effacement of suprasellar cistern. This \par could be due to either increased intracranial pressure or intracranial \par hypotension, as the hypothalamus appears to be somewhat sagging. No abnormal \par parenchymal or leptomeningeal enhancement. Normal intracranial MRA, no \par aneurysms. No intraspinal hemorrhage or mass. Moderate spinal stenosis \par L5-S1.Lumbarized S1 vertebral body. No abnormal enhancement. \par \par 6/29/21 he underwent bilateral MMA (middle meningeal artery) embolization for \par bilateral SDHs (subdural hematomas); s/p RMCA (right middle cerebral artery) \par thrombectomy. \par Post procedure his weakness improved. 6/30 he underwent surveillance lower \par extremity duplex negative for DVTs. He was subsequently transferred to the \par floor. PT/OT/PMR evaluated him and initially recommended acute rehab however \par after re-evaluation and improvement in clinical status he was recommended for \par outpatient PT/OT. Serial CTHs on 6/30 and 7/4 showed stable bilateral SDHs. He \par was evaluated by speech department and recommended a soft diet. On the day of \par discharge he was medically and neurologically stable for discharge to home. \par \par

## 2021-07-26 NOTE — REASON FOR VISIT
[Follow-Up: _____] : a [unfilled] follow-up visit [Spouse] : spouse [FreeTextEntry1] : Jayme Clayton is a 65yr old male significant past medical history who presents with worsening headaches since June 25, 2021. Radiographic imaging with MRI of the brain demonstrated bilateral subdural hematomas and additional findings consistent with possible intracranial hypotension. Additional imaging did not demonstrate evidence of an overt cerebral spinal fluid leak. There was a suggestion of an anterior communicating artery aneurysm. On 6/29/2021 he presented for cerebral angiography to definitively rule out an intracranial aneurysm and possible embolization of the middle meningeal arteries bilaterally.HE underwent bilateral mma embo. CT head notes decrease in size of the subdural hematoma and he was discharged home 7/4/2021. \par Today he presents for a hospital follow up visit feeling well. Generalized fatigue. Headaches have resolved. NO motor sensory speech visual abnormalities.

## 2021-08-19 ENCOUNTER — OUTPATIENT (OUTPATIENT)
Dept: OUTPATIENT SERVICES | Facility: HOSPITAL | Age: 66
LOS: 1 days | End: 2021-08-19
Payer: COMMERCIAL

## 2021-08-19 ENCOUNTER — APPOINTMENT (OUTPATIENT)
Dept: MRI IMAGING | Facility: CLINIC | Age: 66
End: 2021-08-19
Payer: COMMERCIAL

## 2021-08-19 DIAGNOSIS — Z00.8 ENCOUNTER FOR OTHER GENERAL EXAMINATION: ICD-10-CM

## 2021-08-19 PROCEDURE — 70553 MRI BRAIN STEM W/O & W/DYE: CPT | Mod: 26

## 2021-08-19 PROCEDURE — A9585: CPT

## 2021-08-19 PROCEDURE — 70553 MRI BRAIN STEM W/O & W/DYE: CPT

## 2021-09-02 ENCOUNTER — APPOINTMENT (OUTPATIENT)
Dept: NEUROSURGERY | Facility: CLINIC | Age: 66
End: 2021-09-02
Payer: COMMERCIAL

## 2021-09-02 PROCEDURE — 99441: CPT | Mod: 95

## 2023-01-15 ENCOUNTER — APPOINTMENT (OUTPATIENT)
Dept: ORTHOPEDIC SURGERY | Facility: CLINIC | Age: 68
End: 2023-01-15
Payer: MEDICARE

## 2023-01-15 VITALS — HEIGHT: 70 IN | WEIGHT: 195 LBS | BODY MASS INDEX: 27.92 KG/M2

## 2023-01-15 DIAGNOSIS — M17.12 UNILATERAL PRIMARY OSTEOARTHRITIS, LEFT KNEE: ICD-10-CM

## 2023-01-15 PROCEDURE — J3490M: CUSTOM

## 2023-01-15 PROCEDURE — 73562 X-RAY EXAM OF KNEE 3: CPT | Mod: LT

## 2023-01-15 PROCEDURE — 20610 DRAIN/INJ JOINT/BURSA W/O US: CPT

## 2023-01-15 NOTE — ASSESSMENT
[FreeTextEntry1] : He is given a cortisone injection today into the right knee, he is advised rest and ice today.  Activities as tolerated tomorrow.  f/u in 2 weeks with Dr Schuster.

## 2023-01-15 NOTE — HISTORY OF PRESENT ILLNESS
[8] : 8 [3] : 3 [Burning] : burning [Localized] : localized [Sharp] : sharp [Tightness] : tightness [Constant] : constant [de-identified] : 1/15/23: pt is a 66 y/o M presents with left knee pain; onset of pain in november; progression of pain since; pt states there was no injury; pt states that the pain is localized;  [] : no

## 2023-01-15 NOTE — PHYSICAL EXAM
[Right] : right knee [5___] : hamstring 5[unfilled]/5 [Left] : left knee [AP] : anteroposterior [Lateral] : lateral [[place]] : [place] [Degenerative change] : Degenerative change [] : non-antalgic

## 2023-01-15 NOTE — PROCEDURE
[Large Joint Injection] : Large joint injection [Left] : of the left [Knee] : knee [Pain] : pain [Inflammation] : inflammation [X-ray evidence of Osteoarthritis on this or prior visit] : x-ray evidence of Osteoarthritis on this or prior visit [Betadine] : betadine [___ cc    0.25%] : Bupivacaine (Marcaine) ~Vcc of 0.25%  [___ cc    80mg] : Methylprednisolone (Depomedrol) ~Vcc of 80 mg  [] : Patient tolerated procedure well [Call if redness, pain or fever occur] : call if redness, pain or fever occur [Apply ice for 15min out of every hour for the next 12-24 hours as tolerated] : apply ice for 15 minutes out of every hour for the next 12-24 hours as tolerated [Patient was advised to rest the joint(s) for ____ days] : patient was advised to rest the joint(s) for [unfilled] days [Previous OTC use and PT nontherapeutic] : patient has tried OTC's including aspirin, Ibuprofen, Aleve, etc or prescription NSAIDS, and/or exercises at home and/or physical therapy without satisfactory response [Patient had decreased mobility in the joint] : patient had decreased mobility in the joint [Risks, benefits, alternatives discussed / Verbal consent obtained] : the risks benefits, and alternatives have been discussed, and verbal consent was obtained

## 2023-05-24 ENCOUNTER — APPOINTMENT (OUTPATIENT)
Dept: RADIOLOGY | Facility: CLINIC | Age: 68
End: 2023-05-24
Payer: MEDICARE

## 2023-05-24 ENCOUNTER — OUTPATIENT (OUTPATIENT)
Dept: OUTPATIENT SERVICES | Facility: HOSPITAL | Age: 68
LOS: 1 days | End: 2023-05-24
Payer: MEDICARE

## 2023-05-24 DIAGNOSIS — Z00.00 ENCOUNTER FOR GENERAL ADULT MEDICAL EXAMINATION WITHOUT ABNORMAL FINDINGS: ICD-10-CM

## 2023-05-24 PROCEDURE — 71046 X-RAY EXAM CHEST 2 VIEWS: CPT

## 2023-05-24 PROCEDURE — 71046 X-RAY EXAM CHEST 2 VIEWS: CPT | Mod: 26

## 2023-11-14 NOTE — ED ADULT TRIAGE NOTE - CCCP TRG CHIEF CMPLNT
November 14, 2023     Patient: Ace Brooks   YOB: 1966   Date of Visit: 11/14/2023       To Whom it May Concern:    Ace Brooks was seen in my clinic on 11/14/2023 at 2:10 pm.    Please excuse Ace for his absence from work on the date listed above to be able to make his appointment.    Please excuse Ace for his absence from work from 11/13. He is medically cleared to return to work on Monday 11/20.    Sincerely,       Toña Carrillo MD (Electronically signed)        Toña Carrillo MD    Medical information is confidential and cannot be disclosed without the written consent of the patient or his representative.    
headache

## 2024-01-04 ENCOUNTER — OUTPATIENT (OUTPATIENT)
Dept: OUTPATIENT SERVICES | Facility: HOSPITAL | Age: 69
LOS: 1 days | End: 2024-01-04
Payer: COMMERCIAL

## 2024-01-04 ENCOUNTER — APPOINTMENT (OUTPATIENT)
Dept: ULTRASOUND IMAGING | Facility: CLINIC | Age: 69
End: 2024-01-04
Payer: MEDICARE

## 2024-01-04 DIAGNOSIS — Z00.8 ENCOUNTER FOR OTHER GENERAL EXAMINATION: ICD-10-CM

## 2024-01-04 PROCEDURE — 76700 US EXAM ABDOM COMPLETE: CPT | Mod: 26

## 2024-01-04 PROCEDURE — 76700 US EXAM ABDOM COMPLETE: CPT

## 2024-04-21 NOTE — PROVIDER CONTACT NOTE (MEDICATION) - SITUATION
HR <60 bpm, blood pressure normotensive, Nimodipine held <-- Click to add NO significant Past Surgical History

## 2024-08-26 NOTE — DISCHARGE NOTE PROVIDER - REASON FOR ADMISSION
6/29/21 s/p bilateral MMA (middle meningeal artery) embolization for bilateral SDHs (subdural hematomas); s/p RMCA (right middle cerebral artery) thrombectomy.    Additional Notes: Was treated about 5 years ago per patient Detail Level: Detailed Render Risk Assessment In Note?: no Additional Notes: Recheck in 2 months.  Look like bites.  history uncertain.  if still present in 2mo, consider bx. Additional Notes: If increases in size, will refer to general surgery.

## 2024-12-20 ENCOUNTER — OUTPATIENT (OUTPATIENT)
Dept: OUTPATIENT SERVICES | Facility: HOSPITAL | Age: 69
LOS: 1 days | End: 2024-12-20
Payer: COMMERCIAL

## 2024-12-20 ENCOUNTER — APPOINTMENT (OUTPATIENT)
Dept: RADIOLOGY | Facility: CLINIC | Age: 69
End: 2024-12-20

## 2024-12-20 DIAGNOSIS — Z00.00 ENCOUNTER FOR GENERAL ADULT MEDICAL EXAMINATION WITHOUT ABNORMAL FINDINGS: ICD-10-CM

## 2024-12-20 DIAGNOSIS — Z00.8 ENCOUNTER FOR OTHER GENERAL EXAMINATION: ICD-10-CM

## 2024-12-20 PROCEDURE — 72100 X-RAY EXAM L-S SPINE 2/3 VWS: CPT

## 2024-12-20 PROCEDURE — 72100 X-RAY EXAM L-S SPINE 2/3 VWS: CPT | Mod: 26

## 2025-01-03 ENCOUNTER — TRANSCRIPTION ENCOUNTER (OUTPATIENT)
Age: 70
End: 2025-01-03